# Patient Record
Sex: FEMALE | Race: WHITE | URBAN - METROPOLITAN AREA
[De-identification: names, ages, dates, MRNs, and addresses within clinical notes are randomized per-mention and may not be internally consistent; named-entity substitution may affect disease eponyms.]

---

## 2017-02-15 ENCOUNTER — OFFICE VISIT (OUTPATIENT)
Dept: FAMILY MEDICINE | Facility: CLINIC | Age: 27
End: 2017-02-15
Payer: COMMERCIAL

## 2017-02-15 VITALS
HEART RATE: 109 BPM | SYSTOLIC BLOOD PRESSURE: 126 MMHG | OXYGEN SATURATION: 98 % | TEMPERATURE: 97 F | BODY MASS INDEX: 27.49 KG/M2 | WEIGHT: 161 LBS | HEIGHT: 64 IN | DIASTOLIC BLOOD PRESSURE: 87 MMHG

## 2017-02-15 DIAGNOSIS — F41.9 ANXIETY: Primary | ICD-10-CM

## 2017-02-15 DIAGNOSIS — R87.810 ASCUS WITH POSITIVE HIGH RISK HPV CERVICAL: ICD-10-CM

## 2017-02-15 DIAGNOSIS — R87.610 ASCUS WITH POSITIVE HIGH RISK HPV CERVICAL: ICD-10-CM

## 2017-02-15 PROCEDURE — 99214 OFFICE O/P EST MOD 30 MIN: CPT | Performed by: PHYSICIAN ASSISTANT

## 2017-02-15 RX ORDER — ALPRAZOLAM 0.5 MG
0.5 TABLET ORAL 3 TIMES DAILY PRN
Qty: 30 TABLET | Refills: 0 | Status: SHIPPED | OUTPATIENT
Start: 2017-02-15 | End: 2017-12-08

## 2017-02-15 RX ORDER — MULTIPLE VITAMINS W/ MINERALS TAB 9MG-400MCG
1 TAB ORAL DAILY
Qty: 100 TABLET | Refills: 3 | COMMUNITY
Start: 2017-02-15

## 2017-02-15 RX ORDER — ESCITALOPRAM OXALATE 20 MG/1
TABLET ORAL
Qty: 30 TABLET | Refills: 1 | Status: SHIPPED | OUTPATIENT
Start: 2017-02-15 | End: 2017-04-24

## 2017-02-15 ASSESSMENT — ANXIETY QUESTIONNAIRES
7. FEELING AFRAID AS IF SOMETHING AWFUL MIGHT HAPPEN: MORE THAN HALF THE DAYS
6. BECOMING EASILY ANNOYED OR IRRITABLE: SEVERAL DAYS
5. BEING SO RESTLESS THAT IT IS HARD TO SIT STILL: NOT AT ALL
2. NOT BEING ABLE TO STOP OR CONTROL WORRYING: MORE THAN HALF THE DAYS
3. WORRYING TOO MUCH ABOUT DIFFERENT THINGS: MORE THAN HALF THE DAYS
GAD7 TOTAL SCORE: 11
1. FEELING NERVOUS, ANXIOUS, OR ON EDGE: NEARLY EVERY DAY
IF YOU CHECKED OFF ANY PROBLEMS ON THIS QUESTIONNAIRE, HOW DIFFICULT HAVE THESE PROBLEMS MADE IT FOR YOU TO DO YOUR WORK, TAKE CARE OF THINGS AT HOME, OR GET ALONG WITH OTHER PEOPLE: SOMEWHAT DIFFICULT

## 2017-02-15 ASSESSMENT — PATIENT HEALTH QUESTIONNAIRE - PHQ9: 5. POOR APPETITE OR OVEREATING: SEVERAL DAYS

## 2017-02-15 NOTE — NURSING NOTE
"Chief Complaint   Patient presents with     Recheck Medication       Initial /87 (BP Location: Left arm, Patient Position: Left side, Cuff Size: Adult Regular)  Pulse 109  Temp 97  F (36.1  C) (Oral)  Ht 5' 4.17\" (1.63 m)  Wt 161 lb (73 kg)  LMP 01/01/2017  SpO2 98%  BMI 27.49 kg/m2 Estimated body mass index is 27.49 kg/(m^2) as calculated from the following:    Height as of this encounter: 5' 4.17\" (1.63 m).    Weight as of this encounter: 161 lb (73 kg).  Medication Reconciliation: complete   Sara Rdz MA Student      "

## 2017-02-15 NOTE — PROGRESS NOTES
SUBJECTIVE:                                                    Bita Alaniz is a 26 year old female who presents to clinic today for the following health issues:      Medication Followup of Zolof    Taking Medication as prescribed: Stopped taking then restarted taking it again    Side Effects:  Gives her insomnia and joint pain until patient's body gets used to it    Medication Helping Symptoms:  Yes, if taken consistently.     Insurance won't cover zoloft  Anxiety is worse due to job loss.  Very anxious about job interviews.  Has been back on zoloft for one week.  Has helped anxiety too.    Tried paxil in past.    Has to take the zoloft in the am due it causing insomnia   Very anxious about colposcopy that she has to have soon.  She can't stop thinking that they will find cancer.  Doesn't want to go due to anxiety.          Problem list and histories reviewed & adjusted, as indicated.  Additional history: as documented    Patient Active Problem List   Diagnosis     PMS (premenstrual syndrome)     Low back pain     Menorrhagia     CARDIOVASCULAR SCREENING; LDL GOAL LESS THAN 160     Sweating     Anxiety     Contraception management     Genital warts     ASCUS with positive high risk HPV cervical     Past Surgical History   Procedure Laterality Date     C oral surgery procedure       wisdom teeth removed       Social History   Substance Use Topics     Smoking status: Current Every Day Smoker     Packs/day: 0.75     Types: Cigarettes     Smokeless tobacco: Never Used      Comment: started at age 15, not much second hand     Alcohol use Yes      Comment: occasionally     Family History   Problem Relation Age of Onset     Musculoskeletal Disorder Mother      DIABETES Mother      Psychotic Disorder Mother      anxiety, ocd     Musculoskeletal Disorder Maternal Grandmother      CANCER Maternal Grandmother      lung     CANCER Paternal Grandmother      Unknown/Adopted Father            ROS:  As  "above    OBJECTIVE:                                                    /87 (BP Location: Left arm, Patient Position: Left side, Cuff Size: Adult Regular)  Pulse 109  Temp 97  F (36.1  C) (Oral)  Ht 5' 4.17\" (1.63 m)  Wt 161 lb (73 kg)  LMP 01/01/2017  SpO2 98%  BMI 27.49 kg/m2  Body mass index is 27.49 kg/(m^2).  GENERAL: healthy, alert and no distress  PSYCH: mentation appears normal, tearful and anxious    Diagnostic Test Results:  none      ASSESSMENT/PLAN:                                                      1. Anxiety  Try lexapro.  Ok to use xanax as needed until lexapro starts to work.  Follow up in 3 weeks.  Sooner if needed  - escitalopram (LEXAPRO) 20 MG tablet; Take 1/2 tablet (10 mg) for 1 weeks, then increase to 1 tablet orally daily  Dispense: 30 tablet; Refill: 1  - ALPRAZolam (XANAX) 0.5 MG tablet; Take 1 tablet (0.5 mg) by mouth 3 times daily as needed for anxiety  Dispense: 30 tablet; Refill: 0  - multivitamin, therapeutic with minerals (MULTI-VITAMIN) TABS tablet; Take 1 tablet by mouth daily  Dispense: 100 tablet; Refill: 3    2. ASCUS with positive high risk HPV cervical  Spent most of the visit discussing the potential outcomes of the colposcopy and the likelyhood of it being cancer.  Strongly encouraged her to follow through.  25 minutes spent with patient, over 50% of that time spent in counseling and coordinating care        FUTURE APPOINTMENTS:       - Follow-up visit in 3 weeks    Olya Connors PA-C  [facility]    "

## 2017-02-15 NOTE — MR AVS SNAPSHOT
After Visit Summary   2/15/2017    Bita Alaniz    MRN: [id number]           Patient Information     Date Of Birth          1990        Visit Information        Provider Department      2/15/2017 10:40 AM Olya Connors PA-C [facility]        Today's Diagnoses     Anxiety    -  1    ASCUS with positive high risk HPV cervical          Care Instructions    Take the zoloft every other day then start the lexapro  Use xanax as needed-try to limit use          Follow-ups after your visit        Follow-up notes from your care team     Return in about 3 weeks (around 3/8/2017) for mood .      Your next 10 appointments already scheduled     Feb 24, 2017 11:15 AM CST   Office Visit with Ilia Lynn MD, [place] [facility]   [facility] ([address] [id number] [phone]           Bring a current list of meds and any records pertaining to this visit.  For Physicals, please bring immunization records and any forms needing to be filled out.  Please arrive 10 minutes early to complete paperwork.              Who to contact     If you have questions or need follow up information about today's clinic visit or your schedule please contact [facility] directly at [phone].  Normal or non-critical lab and imaging results will be communicated to you by MyChart, letter or phone within 4 business days after the clinic has received the results. If you do not hear from us within 7 days, please contact the clinic through MyChart or phone. If you have a critical or abnormal lab result, we will notify you by phone as soon as possible.  Submit refill requests through [employer] or call your pharmacy and they will forward the refill request to us. Please allow 3 business days for your refill to be completed.          Additional Information About Your Visit        [facility]  "Information     [employer] gives you secure access to your electronic health record. If you see a primary care provider, you can also send messages to your care team and make appointments. If you have questions, please call your primary care clinic.  If you do not have a primary care provider, please call [phone] and they will assist you.        Care EveryWhere ID     This is your Care EveryWhere ID. This could be used by other organizations to access your [facility] medical records  [id number]        Your Vitals Were     Pulse Temperature Height Last Period Pulse Oximetry BMI (Body Mass Index)    109 97  F (36.1  C) (Oral) 5' 4.17\" (1.63 m) 01/01/2017 98% 27.49 kg/m2       Blood Pressure from Last 3 Encounters:   02/15/17 126/87   06/21/16 112/80   01/27/16 122/73    Weight from Last 3 Encounters:   02/15/17 161 lb (73 kg)   06/21/16 150 lb (68 kg)   03/20/16 151 lb (68.5 kg)              Today, you had the following     No orders found for display         Today's Medication Changes          These changes are accurate as of: 2/15/17 11:36 AM.  If you have any questions, ask your nurse or doctor.               Start taking these medicines.        Dose/Directions    ALPRAZolam 0.5 MG tablet   Commonly known as:  XANAX   Used for:  Anxiety   Started by:  Olya Connors PA-C        Dose:  0.5 mg   Take 1 tablet (0.5 mg) by mouth 3 times daily as needed for anxiety   Quantity:  30 tablet   Refills:  0       escitalopram 20 MG tablet   Commonly known as:  LEXAPRO   Used for:  Anxiety   Started by:  Olya Connors PA-C        Take 1/2 tablet (10 mg) for 1 weeks, then increase to 1 tablet orally daily   Quantity:  30 tablet   Refills:  1         Stop taking these medicines if you haven't already. Please contact your care team if you have questions.     sertraline 50 MG tablet   Commonly known as:  ZOLOFT   Stopped by:  Olya Connors PA-C                Where to get your medicines      These " medications were sent to [facility]/pharmacy #[id number] - [place], [place] - [address]  [address] [facility] [place] [address]     Phone:  [phone]     escitalopram 20 MG tablet         Some of these will need a paper prescription and others can be bought over the counter.  Ask your nurse if you have questions.     Bring a paper prescription for each of these medications     ALPRAZolam 0.5 MG tablet                Primary Care Provider Office Phone # Fax #    Olya Connors PA-C [phone]       [facility] [address]  [facility] [address]        Thank you!     Thank you for choosing [facility]  for your care. Our goal is always to provide you with excellent care. Hearing back from our patients is one way we can continue to improve our services. Please take a few minutes to complete the written survey that you may receive in the mail after your visit with us. Thank you!             Your Updated Medication List - Protect others around you: Learn how to safely use, store and throw away your medicines at www.disposemymeds.org.          This list is accurate as of: 2/15/17 11:36 AM.  Always use your most recent med list.                   Brand Name Dispense Instructions for use    ALPRAZolam 0.5 MG tablet    XANAX    30 tablet    Take 1 tablet (0.5 mg) by mouth 3 times daily as needed for anxiety       aluminum chloride 20 % external solution    DRYSOL    60 mL    Apply  topically At Bedtime. To improve effect, cover area of application with plastic wrap,  hold in place with tight shirt, and wash area in morning. As sweating improves, decrease use to 1-2 times weekly.       escitalopram 20 MG tablet    LEXAPRO    30 tablet    Take 1/2 tablet (10 mg) for 1 weeks, then increase to 1 tablet orally daily       ibuprofen 200 MG tablet    ADVIL/MOTRIN     Take 200 mg by mouth as needed. 2-3 TABLETS AT ONE TIME       Multi-vitamin Tabs tablet     100  tablet    Take 1 tablet by mouth daily       norgestim-eth estrad triphasic 0.18/0.215/0.25 MG-35 MCG per tablet    TRINESSA (28)    90 tablet    Take 1 tablet by mouth daily May take continuously, having a period every 3 months.       podofilox 0.5 % external solution    CONDYLOX    60 mL    Apply topically 2 times daily For 3 days then stop for 4 days.  If needed repeat 4 times       TYLENOL 500 MG tablet   Generic drug:  acetaminophen      Take 1-2 tablets by mouth as needed. 1 TABLET AS NEEDED

## 2017-02-16 ASSESSMENT — ANXIETY QUESTIONNAIRES: GAD7 TOTAL SCORE: 11

## 2017-02-24 ENCOUNTER — OFFICE VISIT (OUTPATIENT)
Dept: OBGYN | Facility: CLINIC | Age: 27
End: 2017-02-24
Payer: COMMERCIAL

## 2017-02-24 VITALS
WEIGHT: 169 LBS | BODY MASS INDEX: 28.85 KG/M2 | OXYGEN SATURATION: 98 % | DIASTOLIC BLOOD PRESSURE: 86 MMHG | SYSTOLIC BLOOD PRESSURE: 128 MMHG | HEART RATE: 106 BPM

## 2017-02-24 DIAGNOSIS — N87.0 CIN I (CERVICAL INTRAEPITHELIAL NEOPLASIA I): ICD-10-CM

## 2017-02-24 DIAGNOSIS — R87.610 ASCUS WITH POSITIVE HIGH RISK HPV CERVICAL: Primary | ICD-10-CM

## 2017-02-24 DIAGNOSIS — R87.810 ASCUS WITH POSITIVE HIGH RISK HPV CERVICAL: Primary | ICD-10-CM

## 2017-02-24 PROCEDURE — 99213 OFFICE O/P EST LOW 20 MIN: CPT | Mod: 25 | Performed by: OBSTETRICS & GYNECOLOGY

## 2017-02-24 PROCEDURE — 88305 TISSUE EXAM BY PATHOLOGIST: CPT | Performed by: OBSTETRICS & GYNECOLOGY

## 2017-02-24 PROCEDURE — 88342 IMHCHEM/IMCYTCHM 1ST ANTB: CPT | Performed by: OBSTETRICS & GYNECOLOGY

## 2017-02-24 PROCEDURE — 57454 BX/CURETT OF CERVIX W/SCOPE: CPT | Performed by: OBSTETRICS & GYNECOLOGY

## 2017-02-24 PROCEDURE — 88341 IMHCHEM/IMCYTCHM EA ADD ANTB: CPT | Performed by: OBSTETRICS & GYNECOLOGY

## 2017-02-24 NOTE — LETTER
April 21, 2017      Bita Mata Corbin  [address] [place] [id number]    Dear MsJaninaEsperanza Alaniz,      At [facility], your health and wellness is our primary concern. That is why we are following up on recommendations from Dr. Lynn. Your recent colposcopy showed NATE 2-3 and CIS, which is clinically significant and needs follow up. Dr. Lynn recommended that you have a LEEP completed by 5/4/17. Our records do not show that this has been done.      It is important to complete the follow up that your provider has suggested for you to ensure that there are no worsening changes which may, over time, develop into cancer.      Please contact our office at  [phone] to schedule an appointment for a LEEP at your earliest convenience. If you have questions or concerns, please call the clinic and we will be happy to assist you.    If you have completed the tests outside of [facility], please have the results forwarded to our office. We will update the chart for your primary Physician to review before your next annual physical.     Thank you for choosing [facility]!    Sincerely,      Ilia Lynn MD/cmc

## 2017-02-24 NOTE — NURSING NOTE
"Chief Complaint   Patient presents with     Colposcopy     ASC-US HPV other+. Had colp 6/2015 also       Initial /86 (BP Location: Right arm, Patient Position: Chair, Cuff Size: Adult Regular)  Pulse 106  Wt 169 lb (76.7 kg)  LMP 01/01/2017  SpO2 98%  Breastfeeding? No  BMI 28.85 kg/m2 Estimated body mass index is 28.85 kg/(m^2) as calculated from the following:    Height as of 2/15/17: 5' 4.17\" (1.63 m).    Weight as of this encounter: 169 lb (76.7 kg).  Medication Reconciliation: complete   JOE Tovar 2/24/2017         "

## 2017-02-24 NOTE — PROGRESS NOTES
Patient Name: Bita Alaniz              Date: 2/24/2017   YOB: 1990                         Age: 26 year old   Phone: [phone] (home)   ________________________________________________________________________  Bita is here today to discuss the pap smear, findings and possible further evaluation.  The patient's pap smear history is as noted:  1/28/15 ASCUS + HR HPV pap.   6/24/15 colposcopy. NATE 1. Plan: repeat pap and HPV test in 1 year. (due 6/24/16).   6/21/16 ASCUS/+ HR HPV  I attempted to ensure that the patient was educated regarding the nature of her findings and implications to date.  We reviewed the role of HPV, incidence in the population and the natural history of the infection, and its transmission.  We also reviewed ways to minimize her future risk, the effect of HPV on the cervix and treatment options available, should they be indicated.    The pathophysiology of the cervix, including a discussion of the squamous and columnar cells, metaplasia and dysplasia have been reviewed, drawings, sketches and the pamphlets were reviewed with her.      Patient's last menstrual period was 01/01/2017.  Current Birth Control Method: OCPs  Age at first sexual intercourse: 13 years old  Number of sexual partners (lifetime): 10  No new sexual partners in past 6 years.   History of veneral diseases: : No  History of genital warts: No  Visible warts now?: No  Family History of Cervical, Uterine or Vaginal Cancer?: No    Past Medical History   Diagnosis Date     ASCUS with positive high risk HPV 1/28/15, 6/20/16     colposcopy 6/2015     Depression      resolved     JRA (juvenile rheumatoid arthritis) (H) 1993     knees,feet      LBP (low back pain)      Menorrhagia 3/11/2010     Ovarian cyst      PMS (premenstrual syndrome)        Past Surgical History   Procedure Laterality Date     C oral surgery procedure       wisdom teeth removed        Outpatient Encounter Prescriptions as of 2/24/2017    Medication Sig Dispense Refill     escitalopram (LEXAPRO) 20 MG tablet Take 1/2 tablet (10 mg) for 1 weeks, then increase to 1 tablet orally daily 30 tablet 1     ALPRAZolam (XANAX) 0.5 MG tablet Take 1 tablet (0.5 mg) by mouth 3 times daily as needed for anxiety 30 tablet 0     multivitamin, therapeutic with minerals (MULTI-VITAMIN) TABS tablet Take 1 tablet by mouth daily 100 tablet 3     norgestim-eth estrad triphasic (TRINESSA, 28,) 0.18/0.215/0.25 MG-35 MCG per tablet Take 1 tablet by mouth daily May take continuously, having a period every 3 months. 90 tablet 4     podofilox (CONDYLOX) 0.5 % external solution Apply topically 2 times daily For 3 days then stop for 4 days.  If needed repeat 4 times 60 mL 5     aluminum chloride (DRYSOL) 20 % external solution Apply  topically At Bedtime. To improve effect, cover area of application with plastic wrap,  hold in place with tight shirt, and wash area in morning. As sweating improves, decrease use to 1-2 times weekly. (Patient not taking: Reported on 2/15/2017) 60 mL 3     ibuprofen (ADVIL,MOTRIN) 200 MG tablet Take 200 mg by mouth as needed. 2-3 TABLETS AT ONE TIME       acetaminophen (TYLENOL) 500 MG tablet Take 1-2 tablets by mouth as needed. 1 TABLET AS NEEDED       No facility-administered encounter medications on file as of 2/24/2017.         Allergies as of 02/24/2017 - Tam as Reviewed 02/24/2017   Allergen Reaction Noted     Penicillins Hives 11/18/2009     Tramadol Nausea 08/10/2015     Vicodin [hydrocodone-acetaminophen] Nausea 08/10/2015       Social History     Social History     Marital status: Single     Spouse name: N/A     Number of children: N/A     Years of education: N/A     Social History Main Topics     Smoking status: Current Every Day Smoker     Packs/day: 0.75     Types: Cigarettes     Smokeless tobacco: Never Used      Comment: started at age 15, not much second hand     Alcohol use Yes      Comment: occasionally     Drug use: No      Sexual activity: Yes     Partners: Male      Comment: 1 partner     Other Topics Concern     Parent/Sibling W/ Cabg, Mi Or Angioplasty Before 65f 55m? No      Service No     Blood Transfusions No     Caffeine Concern No     1 cup coffee daily and a can pop.     Occupational Exposure No     Hobby Hazards No     Sleep Concern No     Stress Concern No     Weight Concern No     Special Diet No     Exercise Yes     walking 2 times a week and walk a lot at work.     Seat Belt Yes     Social History Narrative    Lives with her boyfriend at home.        Family History   Problem Relation Age of Onset     Musculoskeletal Disorder Mother      DIABETES Mother      Psychotic Disorder Mother      anxiety, ocd     Musculoskeletal Disorder Maternal Grandmother      CANCER Maternal Grandmother      lung     CANCER Paternal Grandmother      Unknown/Adopted Father          Review Of Systems  Review of Systems:  10 point ROS of systems including Constitutional, Eyes, Respiratory, Cardiovascular, Gastroenterology, Genitourinary, Integumentary, Muscularskeletal, Psychiatric were all negative except for pertinent positives noted in my HPI and in the PMH.      Exam:   /86 (BP Location: Right arm, Patient Position: Chair, Cuff Size: Adult Regular)  Pulse 106  Wt 169 lb (76.7 kg)  LMP 01/01/2017  SpO2 98%  Breastfeeding? No  BMI 28.85 kg/m2  GENERAL:  WNWD female NAD  HEENT: NC/AT, EOMI  SKIN: normal skin turgor  GAIT: Normal  NECK: Symmetrical, no masses noted   VULVA: Normal Genitalia  BUS: Normal  URETHRA:  No hypermobility noted  URETHRAL MEATUS:  No masses noted  VAGINA: Normal mucosa, no discharge  CERVIX: Closed, mobile, no discharge  PERIANAL:  No masses or lesions seen  EXTREMITIES: no clubbing, cyanosis, or edema    Assessment:  ASCUS pap smear with high risk HPV  NATE I    Plan:  Recommend to Proceed with Colpo  The details of the colposcopic procedure were reviewed, the risks of missed diagnoses, pain, infection, and  bleeding.    TT 20 min, in addition to the time for the procedure  CT greater than 50%, as noted above in the HPI and in the Plan.     Ilia Lynn MD        Procedure:  Procedure for colposcopy and biopsy has been explained to the patient and consent obtained.    Before the procedure, it was ensured that the patient was educated regarding the nature of her findings and implications to date.  We reviewed the role of HPV and the natural history of the infection.  We also reviewed ways to minimize her future risk, the effect of HPV on the cervix and treatment options available, should they be indicated.    The pathophysiology of the cervix, including a discussion of the squamous and columnar cells, metaplasia and dysplasia have been reviewed, drawings, sketches and the pamphlets were reviewed with her.  The details of the colposcopic procedure were reviewed, the risks of missed diagnoses, pain, infection, and bleeding.  Questions seemed to be answered before proceeding and the patient then consented to the procedure.     Speculum placed in vagina and excellent visualization of cervix achieved, cervix swabbed  with acetic acid solution.    biopsies taken (including ECC): 3  Hemostasis effected with Monsel's solution.    Findings:    No images are attached to the encounter.     Cervix: acetowhitening noted around the 5 and 7 o'clock areas of the cervix and no concerning findings  Vaginal inspection: normal without visible lesions.  Procedure Summary: Patient tolerated procedure well and colposcopy adequate.      Assessment:   ASCUS pap smear and high risk HPV  NATE I    Plan:  Specimens labelled and sent to pathology.  Will base further treatment on pathology findings.  Post biopsy instructions given to patient and call to discuss Pathology results.    Ilia Lynn MD

## 2017-02-24 NOTE — MR AVS SNAPSHOT
After Visit Summary   2/24/2017    Bita Alaniz    MRN: [id number]           Patient Information     Date Of Birth          1990        Visit Information        Provider Department      2/24/2017 11:15 AM Ilia Lynn MD; [facility]        Today's Diagnoses     ASCUS with positive high risk HPV cervical    -  1    NATE I (cervical intraepithelial neoplasia I)           Follow-ups after your visit        Who to contact     If you have questions or need follow up information about today's clinic visit or your schedule please contact [facility] directly at [phone].  Normal or non-critical lab and imaging results will be communicated to you by MyChart, letter or phone within 4 business days after the clinic has received the results. If you do not hear from us within 7 days, please contact the clinic through [email or web address] or phone. If you have a critical or abnormal lab result, we will notify you by phone as soon as possible.  Submit refill requests through [employer] or call your pharmacy and they will forward the refill request to us. Please allow 3 business days for your refill to be completed.          Additional Information About Your Visit        MyChart Information     [employer] gives you secure access to your electronic health record. If you see a primary care provider, you can also send messages to your care team and make appointments. If you have questions, please call your primary care clinic.  If you do not have a primary care provider, please call [phone] and they will assist you.        Care EveryWhere ID     This is your Care EveryWhere ID. This could be used by other organizations to access your [place] medical records  [id number]        Your Vitals Were     Pulse Last Period Pulse Oximetry Breastfeeding? BMI (Body Mass Index)       106 01/01/2017 98% No 28.85 kg/m2        Blood Pressure from Last 3 Encounters:    02/24/17 128/86   02/15/17 126/87   06/21/16 112/80    Weight from Last 3 Encounters:   02/24/17 169 lb (76.7 kg)   02/15/17 161 lb (73 kg)   06/21/16 150 lb (68 kg)              We Performed the Following     COLP CERVIX/UPPER VAGINA     Surgical pathology exam        Primary Care Provider Office Phone # Fax #    Olya Connors PA-C [phone]       [facility] [address]  [facility] [place] [address]        Thank you!     Thank you for choosing [facility]  for your care. Our goal is always to provide you with excellent care. Hearing back from our patients is one way we can continue to improve our services. Please take a few minutes to complete the written survey that you may receive in the mail after your visit with us. Thank you!             Your Updated Medication List - Protect others around you: Learn how to safely use, store and throw away your medicines at www.disposemymeds.org.          This list is accurate as of: 2/24/17 12:06 PM.  Always use your most recent med list.                   Brand Name Dispense Instructions for use    ALPRAZolam 0.5 MG tablet    XANAX    30 tablet    Take 1 tablet (0.5 mg) by mouth 3 times daily as needed for anxiety       aluminum chloride 20 % external solution    DRYSOL    60 mL    Apply  topically At Bedtime. To improve effect, cover area of application with plastic wrap,  hold in place with tight shirt, and wash area in morning. As sweating improves, decrease use to 1-2 times weekly.       escitalopram 20 MG tablet    LEXAPRO    30 tablet    Take 1/2 tablet (10 mg) for 1 weeks, then increase to 1 tablet orally daily       ibuprofen 200 MG tablet    ADVIL/MOTRIN     Take 200 mg by mouth as needed. 2-3 TABLETS AT ONE TIME       Multi-vitamin Tabs tablet     100 tablet    Take 1 tablet by mouth daily       norgestim-eth estrad triphasic 0.18/0.215/0.25 MG-35 MCG per tablet    TRINESSA (28)    90 tablet    Take 1  tablet by mouth daily May take continuously, having a period every 3 months.       podofilox 0.5 % external solution    CONDYLOX    60 mL    Apply topically 2 times daily For 3 days then stop for 4 days.  If needed repeat 4 times       TYLENOL 500 MG tablet   Generic drug:  acetaminophen      Take 1-2 tablets by mouth as needed. 1 TABLET AS NEEDED

## 2017-03-01 LAB — COPATH REPORT: NORMAL

## 2017-04-21 PROBLEM — D06.9 CIN III (CERVICAL INTRAEPITHELIAL NEOPLASIA III): Status: ACTIVE | Noted: 2017-04-07

## 2017-04-25 ENCOUNTER — TELEPHONE (OUTPATIENT)
Dept: OBGYN | Facility: CLINIC | Age: 27
End: 2017-04-25

## 2017-04-25 NOTE — TELEPHONE ENCOUNTER
Myra Garrido contacted Bita on 04/25/17 and left a message. If patient calls back please contact care team.

## 2017-05-01 ENCOUNTER — TELEPHONE (OUTPATIENT)
Dept: OBGYN | Facility: CLINIC | Age: 27
End: 2017-05-01

## 2017-05-01 NOTE — TELEPHONE ENCOUNTER
Reason for Call:  Other appointment    Detailed comments: Patient would like a call back for schedule for a Leep procedure    Phone Number Patient can be reached at: Home number on file [phone] (home) or Cell number on file:    Telephone Information:   Mobile [phone]       Best Time: anytime    Can we leave a detailed message on this number? YES    Call taken on 5/1/2017 at 1:30 PM by Shanta Obrien

## 2017-05-02 NOTE — TELEPHONE ENCOUNTER
I called patient and scheduled her for LEEP procedure at [place] on 5/26/17 12:15.  She No showed previous. Told to be sure and cancel ahead of time if needs too.  She will take Ibuprofen 1 hour ahead.  JOE Tovar 5/2/2017

## 2017-05-26 ENCOUNTER — OFFICE VISIT (OUTPATIENT)
Dept: OBGYN | Facility: CLINIC | Age: 27
End: 2017-05-26
Payer: COMMERCIAL

## 2017-05-26 VITALS
DIASTOLIC BLOOD PRESSURE: 88 MMHG | OXYGEN SATURATION: 100 % | WEIGHT: 177.4 LBS | HEART RATE: 115 BPM | SYSTOLIC BLOOD PRESSURE: 128 MMHG | BODY MASS INDEX: 30.29 KG/M2

## 2017-05-26 DIAGNOSIS — D06.9 CIS (CARCINOMA IN SITU OF CERVIX): Primary | ICD-10-CM

## 2017-05-26 LAB — BETA HCG QUAL IFA URINE: NEGATIVE

## 2017-05-26 PROCEDURE — 84703 CHORIONIC GONADOTROPIN ASSAY: CPT | Performed by: OBSTETRICS & GYNECOLOGY

## 2017-05-26 PROCEDURE — 57461 CONZ OF CERVIX W/SCOPE LEEP: CPT | Performed by: OBSTETRICS & GYNECOLOGY

## 2017-05-26 PROCEDURE — 88305 TISSUE EXAM BY PATHOLOGIST: CPT | Performed by: OBSTETRICS & GYNECOLOGY

## 2017-05-26 NOTE — MR AVS SNAPSHOT
After Visit Summary   5/26/2017    Bita Alaniz    MRN: [id number]           Patient Information     Date Of Birth          1990        Visit Information        Provider Department      5/26/2017 12:15 PM Ilia Lynn MD; [facility] PROC ROOM [place] [facility]        Today's Diagnoses     CIS (carcinoma in situ of cervix)    -  1       Follow-ups after your visit        Who to contact     If you have questions or need follow up information about today's clinic visit or your schedule please contact [facility] directly at [phone].  Normal or non-critical lab and imaging results will be communicated to you by [employer], letter or phone within 4 business days after the clinic has received the results. If you do not hear from us within 7 days, please contact the clinic through [employer] or phone. If you have a critical or abnormal lab result, we will notify you by phone as soon as possible.  Submit refill requests through [employer] or call your pharmacy and they will forward the refill request to us. Please allow 3 business days for your refill to be completed.          Additional Information About Your Visit        MyChart Information     [employer] gives you secure access to your electronic health record. If you see a primary care provider, you can also send messages to your care team and make appointments. If you have questions, please call your primary care clinic.  If you do not have a primary care provider, please call [phone] and they will assist you.        Care EveryWhere ID     This is your Care EveryWhere ID. This could be used by other organizations to access your [place] medical records  [id number]        Your Vitals Were     Pulse Last Period Pulse Oximetry BMI (Body Mass Index)          115 03/26/2017 100% 30.29 kg/m2         Blood Pressure from Last 3 Encounters:   05/26/17 128/88   02/24/17 128/86   02/15/17 126/87    Weight from Last 3  Encounters:   05/26/17 177 lb 6.4 oz (80.5 kg)   02/24/17 169 lb (76.7 kg)   02/15/17 161 lb (73 kg)              We Performed the Following     Beta HCG qual IFA urine     COLP CERVIX/UPPER VAGINA W LOOP ELEC CONIZATION CERVIX     Surgical pathology exam        Primary Care Provider Office Phone # Fax #    Olya Connors PA-C [phone]       [facility] [address]  [facility] [address]        Thank you!     Thank you for choosing [facility]  for your care. Our goal is always to provide you with excellent care. Hearing back from our patients is one way we can continue to improve our services. Please take a few minutes to complete the written survey that you may receive in the mail after your visit with us. Thank you!             Your Updated Medication List - Protect others around you: Learn how to safely use, store and throw away your medicines at www.disposemymeds.org.          This list is accurate as of: 5/26/17  1:59 PM.  Always use your most recent med list.                   Brand Name Dispense Instructions for use    ALPRAZolam 0.5 MG tablet    XANAX    30 tablet    Take 1 tablet (0.5 mg) by mouth 3 times daily as needed for anxiety       aluminum chloride 20 % external solution    DRYSOL    60 mL    Apply  topically At Bedtime. To improve effect, cover area of application with plastic wrap,  hold in place with tight shirt, and wash area in morning. As sweating improves, decrease use to 1-2 times weekly.       escitalopram 20 MG tablet    LEXAPRO    30 tablet    Take 1 tablet (20 mg) by mouth daily Due for office visit       ibuprofen 200 MG tablet    ADVIL/MOTRIN     Take 200 mg by mouth as needed. 2-3 TABLETS AT ONE TIME       Multi-vitamin Tabs tablet     100 tablet    Take 1 tablet by mouth daily       norgestim-eth estrad triphasic 0.18/0.215/0.25 MG-35 MCG per tablet    TRINESSA (28)    90 tablet    Take 1 tablet by mouth daily May take  continuously, having a period every 3 months.       podofilox 0.5 % external solution    CONDYLOX    60 mL    Apply topically 2 times daily For 3 days then stop for 4 days.  If needed repeat 4 times       TYLENOL 500 MG tablet   Generic drug:  acetaminophen      Take 1-2 tablets by mouth as needed. 1 TABLET AS NEEDED

## 2017-05-26 NOTE — PROGRESS NOTES
Patient Name: Bita Alaniz              Date: 5/26/2017   YOB: 1990                         Age: 26 year old   Phone: [phone] (home)   ________________________________________________________________________  Patient is here today for the LEEP procedure.   The pap smear history and colpo history discussed.   We also discussed the tobacco use which has an effect on the cervical tissue also  Tobacco cessation discussed.  She will see her primary for additional assistance.      1/28/15 ASCUS + HR HPV pap. Plan: colposcopy  6/24/15 colposcopy. NATE 1. Plan: repeat pap and HPV test in 1 year. (due 6/24/16).   6/21/16 ASCUS/+ HR HPV. Plan: colposcopy bef 9/21/16. Patient lost her medical insurance. RN will send her resources for financial assistance.   12/8/16  [place] not done. Tracking updated for 6 mo pap.   2/24/17 [place] bx: NATE 2-3, CIS.      Past Medical History:   Diagnosis Date     ASCUS with positive high risk HPV 1/28/15, 6/20/16    colposcopy 6/2015     Depression     resolved     H/O colposcopy with cervical biopsy 02/24/2017    NATE 2/3, CIS     JRA (juvenile rheumatoid arthritis) (H) 1993    knees,feet      LBP (low back pain)      Menorrhagia 3/11/2010     Ovarian cyst      PMS (premenstrual syndrome)        Past Surgical History:   Procedure Laterality Date     C ORAL SURGERY PROCEDURE      wisdom teeth removed        Outpatient Encounter Prescriptions as of 5/26/2017   Medication Sig Dispense Refill     escitalopram (LEXAPRO) 20 MG tablet Take 1 tablet (20 mg) by mouth daily Due for office visit 30 tablet 0     ALPRAZolam (XANAX) 0.5 MG tablet Take 1 tablet (0.5 mg) by mouth 3 times daily as needed for anxiety 30 tablet 0     multivitamin, therapeutic with minerals (MULTI-VITAMIN) TABS tablet Take 1 tablet by mouth daily 100 tablet 3     norgestim-eth estrad triphasic (TRINESSA, 28,) 0.18/0.215/0.25 MG-35 MCG per tablet Take 1 tablet by mouth daily May take continuously, having a period  every 3 months. 90 tablet 4     podofilox (CONDYLOX) 0.5 % external solution Apply topically 2 times daily For 3 days then stop for 4 days.  If needed repeat 4 times 60 mL 5     aluminum chloride (DRYSOL) 20 % external solution Apply  topically At Bedtime. To improve effect, cover area of application with plastic wrap,  hold in place with tight shirt, and wash area in morning. As sweating improves, decrease use to 1-2 times weekly. 60 mL 3     ibuprofen (ADVIL,MOTRIN) 200 MG tablet Take 200 mg by mouth as needed. 2-3 TABLETS AT ONE TIME       acetaminophen (TYLENOL) 500 MG tablet Take 1-2 tablets by mouth as needed. 1 TABLET AS NEEDED       No facility-administered encounter medications on file as of 5/26/2017.         Allergies as of 05/26/2017 - Tam as Reviewed 02/24/2017   Allergen Reaction Noted     Penicillins Hives 11/18/2009     Tramadol Nausea 08/10/2015     Vicodin [hydrocodone-acetaminophen] Nausea 08/10/2015       Social History     Social History     Marital status: Single     Spouse name: N/A     Number of children: N/A     Years of education: N/A     Social History Main Topics     Smoking status: Current Every Day Smoker     Packs/day: 0.75     Types: Cigarettes     Smokeless tobacco: Never Used      Comment: started at age 15, not much second hand     Alcohol use Yes      Comment: occasionally     Drug use: No     Sexual activity: Yes     Partners: Male      Comment: 1 partner     Other Topics Concern     Parent/Sibling W/ Cabg, Mi Or Angioplasty Before 65f 55m? No      Service No     Blood Transfusions No     Caffeine Concern No     1 cup coffee daily and a can pop.     Occupational Exposure No     Hobby Hazards No     Sleep Concern No     Stress Concern No     Weight Concern No     Special Diet No     Exercise Yes     walking 2 times a week and walk a lot at work.     Seat Belt Yes     Social History Narrative    Lives with her boyfriend at home.        Family History   Problem Relation Age  of Onset     Musculoskeletal Disorder Mother      DIABETES Mother      Psychotic Disorder Mother      anxiety, ocd     Musculoskeletal Disorder Maternal Grandmother      CANCER Maternal Grandmother      lung     CANCER Paternal Grandmother      Unknown/Adopted Father          Review of Systems:  10 point ROS of systems including Constitutional, Eyes, Respiratory, Cardiovascular, Gastroenterology, Genitourinary, Integumentary, Muscularskeletal, Psychiatric were all negative except for pertinent positives noted in my HPI and in the PMH.      Exam:   /88 (BP Location: Right arm, Cuff Size: Adult Regular)  Pulse 115  Wt 177 lb 6.4 oz (80.5 kg)  LMP 03/26/2017  SpO2 100%  BMI 30.29 kg/m2  GENERAL:  WNWD female NAD  HEENT: NC/AT, EOMI  SKIN: normal skin turgor  GAIT: Normal  NECK: Symmetrical, no masses noted   VULVA: Normal Genitalia  BUS: Normal  URETHRA:  No hypermobility noted  URETHRAL MEATUS:  No masses noted  VAGINA: Normal mucosa, no discharge  CERVIX: Closed, mobile, no discharge  PERIANAL:  No masses or lesions seen  EXTREMITIES: no clubbing, cyanosis, or edema    Assessment:  CIS  Tobacco use     Plan:  Recommend to Proceed with the LEEP  The details of the colposcopic procedure were reviewed, the risks of missed diagnoses, pain, infection, and bleeding.  Recommend to discontinue the tobacco.  Her partner does not smoke but her parents do.  See Olya Connors for additional assistance regarding the tobacco use.      Ilia Lynn MD        Procedure:  The patient and I discussed the procedure and the indications.   We reviewed the risks, benefits, goals, and limitations, as well as the potential complications.  Alternatives were also reviewed.  The anticipated post-op course, including the success and failure rates, limitations and consequences, was reviewed.  The potential for fertility issues, 1/5 risk, was also reviewed.  She voiced her understanding and she desired to proceed with the  procedure.   After appropriate preparation, the colposcopic findings were noted.  Ten cc of 1% lidocaine with epinephrine, was injected into the cervix.  Using the wire loop and a blended current, the excision was performed with the specimen as labeled removing the transformation zone and the anticipated lesion with satisfactory margins and depth.  The inner cone was performed with the smaller loop to obtain additional endocervical tissue.   Endocervical curettings were obtained.  Light fulguration was performed and Monsel's solution/paste was applied.  No apparent complications.  Blood loose was minimal.  Patient tolerated the procedure well.    Patient was stable at discharge.  Await results.  Results will be called to patient or sent to her.  Instructions and information were given to the patient.    Follow up and repeat pap smears were discussed.

## 2017-05-26 NOTE — NURSING NOTE
"Chief Complaint   Patient presents with     Leep       Initial /88 (BP Location: Right arm, Cuff Size: Adult Regular)  Pulse 115  Wt 177 lb 6.4 oz (80.5 kg)  LMP 03/26/2017  SpO2 100%  BMI 30.29 kg/m2 Estimated body mass index is 30.29 kg/(m^2) as calculated from the following:    Height as of 2/15/17: 5' 4.17\" (1.63 m).    Weight as of this encounter: 177 lb 6.4 oz (80.5 kg).  Medication Reconciliation: complete   JOE Tovar 5/26/2017         "

## 2017-05-31 LAB — COPATH REPORT: NORMAL

## 2017-06-29 ENCOUNTER — MYC MEDICAL ADVICE (OUTPATIENT)
Dept: FAMILY MEDICINE | Facility: CLINIC | Age: 27
End: 2017-06-29

## 2017-06-29 DIAGNOSIS — Z30.41 ENCOUNTER FOR SURVEILLANCE OF CONTRACEPTIVE PILLS: ICD-10-CM

## 2017-06-29 NOTE — TELEPHONE ENCOUNTER
norgestim-eth estrad triphasic (TRINESSA, 28,) 0.18/0.215/0.25 MG-35 MCG per tablet      Last Written Prescription Date: 6-21-16  Last Fill Quantity: 90, # refills: 4  Last Office Visit with [facility], [facility] or [facility] prescribing provider: 2-15-17       BP Readings from Last 3 Encounters:   05/26/17 128/88   02/24/17 128/86   02/15/17 126/87     Date of last Breast Exam:

## 2017-06-30 DIAGNOSIS — A63.0 GENITAL WARTS: ICD-10-CM

## 2017-06-30 RX ORDER — NORGESTIMATE AND ETHINYL ESTRADIOL 7DAYSX3 28
1 KIT ORAL DAILY
Qty: 90 TABLET | Refills: 1 | Status: SHIPPED | OUTPATIENT
Start: 2017-06-30 | End: 2017-12-08

## 2017-06-30 NOTE — TELEPHONE ENCOUNTER
See note from last visit with Olya Connors PA-C:    Instructions        Return in about 3 weeks (around 3/8/2017) for mood .   Take the zoloft every other day then start the lexapro  Use xanax as needed-try to limit use           Prescription approved per [facility] Refill Protocol.    MyChart message sent to patient advising she still needs to be seen for mood issues.  Jennie Main RN  [facility]

## 2017-06-30 NOTE — TELEPHONE ENCOUNTER
Routing refill request to provider for review/approval because:  Drug not on the [facility] refill protocol     Jennie Main RN  [facility]

## 2017-06-30 NOTE — TELEPHONE ENCOUNTER
podofilox (CONDYLOX) 0.5 % external solution      Last Written Prescription Date:  6/21/16  Last Fill Quantity: 60,   # refills: 5  [facility] prescribing provider: 2/15/17  Future Office visit:       Routing refill request to provider for review/approval because:  Drug not on the [facility] refill protocol or controlled substance

## 2017-07-03 RX ORDER — PODOFILOX 5 MG/ML
SOLUTION TOPICAL
Qty: 59.5 ML | Refills: 3 | Status: SHIPPED | OUTPATIENT
Start: 2017-07-03 | End: 2018-03-21

## 2017-12-08 ENCOUNTER — RADIANT APPOINTMENT (OUTPATIENT)
Dept: GENERAL RADIOLOGY | Facility: CLINIC | Age: 27
End: 2017-12-08
Attending: PHYSICIAN ASSISTANT
Payer: COMMERCIAL

## 2017-12-08 ENCOUNTER — OFFICE VISIT (OUTPATIENT)
Dept: FAMILY MEDICINE | Facility: CLINIC | Age: 27
End: 2017-12-08
Payer: COMMERCIAL

## 2017-12-08 VITALS
HEART RATE: 111 BPM | WEIGHT: 187.8 LBS | BODY MASS INDEX: 32.06 KG/M2 | DIASTOLIC BLOOD PRESSURE: 83 MMHG | TEMPERATURE: 97.9 F | SYSTOLIC BLOOD PRESSURE: 123 MMHG

## 2017-12-08 DIAGNOSIS — M79.671 RIGHT FOOT PAIN: ICD-10-CM

## 2017-12-08 DIAGNOSIS — F41.9 ANXIETY: ICD-10-CM

## 2017-12-08 DIAGNOSIS — M79.671 RIGHT FOOT PAIN: Primary | ICD-10-CM

## 2017-12-08 DIAGNOSIS — Z30.41 ENCOUNTER FOR SURVEILLANCE OF CONTRACEPTIVE PILLS: ICD-10-CM

## 2017-12-08 DIAGNOSIS — Z72.0 TOBACCO ABUSE DISORDER: ICD-10-CM

## 2017-12-08 PROCEDURE — 73630 X-RAY EXAM OF FOOT: CPT | Mod: RT

## 2017-12-08 PROCEDURE — 99214 OFFICE O/P EST MOD 30 MIN: CPT | Performed by: PHYSICIAN ASSISTANT

## 2017-12-08 RX ORDER — NORGESTIMATE AND ETHINYL ESTRADIOL 7DAYSX3 28
1 KIT ORAL DAILY
Qty: 90 TABLET | Refills: 1 | Status: SHIPPED | OUTPATIENT
Start: 2017-12-08 | End: 2018-05-21

## 2017-12-08 RX ORDER — OXYCODONE AND ACETAMINOPHEN 5; 325 MG/1; MG/1
1 TABLET ORAL EVERY 8 HOURS PRN
Qty: 21 TABLET | Refills: 0 | Status: SHIPPED | OUTPATIENT
Start: 2017-12-08 | End: 2018-01-05

## 2017-12-08 RX ORDER — NICOTINE 21 MG/24HR
1 PATCH, TRANSDERMAL 24 HOURS TRANSDERMAL EVERY 24 HOURS
Qty: 30 PATCH | Refills: 1 | Status: SHIPPED | OUTPATIENT
Start: 2017-12-08 | End: 2018-03-21

## 2017-12-08 RX ORDER — ALPRAZOLAM 0.5 MG
0.5 TABLET ORAL 3 TIMES DAILY PRN
Qty: 30 TABLET | Refills: 0 | Status: SHIPPED | OUTPATIENT
Start: 2017-12-08 | End: 2018-05-21

## 2017-12-08 ASSESSMENT — ANXIETY QUESTIONNAIRES
GAD7 TOTAL SCORE: 5
6. BECOMING EASILY ANNOYED OR IRRITABLE: NOT AT ALL
IF YOU CHECKED OFF ANY PROBLEMS ON THIS QUESTIONNAIRE, HOW DIFFICULT HAVE THESE PROBLEMS MADE IT FOR YOU TO DO YOUR WORK, TAKE CARE OF THINGS AT HOME, OR GET ALONG WITH OTHER PEOPLE: SOMEWHAT DIFFICULT
1. FEELING NERVOUS, ANXIOUS, OR ON EDGE: SEVERAL DAYS
2. NOT BEING ABLE TO STOP OR CONTROL WORRYING: SEVERAL DAYS
7. FEELING AFRAID AS IF SOMETHING AWFUL MIGHT HAPPEN: SEVERAL DAYS
3. WORRYING TOO MUCH ABOUT DIFFERENT THINGS: SEVERAL DAYS
5. BEING SO RESTLESS THAT IT IS HARD TO SIT STILL: NOT AT ALL

## 2017-12-08 ASSESSMENT — PATIENT HEALTH QUESTIONNAIRE - PHQ9
SUM OF ALL RESPONSES TO PHQ QUESTIONS 1-9: 0
5. POOR APPETITE OR OVEREATING: SEVERAL DAYS

## 2017-12-08 NOTE — PROGRESS NOTES
"  SUBJECTIVE:   Bita Alaniz is a 27 year old female who presents to clinic today for the following health issues:      Joint Pain    Onset: Couple weeks of intense pain    Description:   Location: right ankle and and top of right foot  Character: Sharp    Intensity: 8/10- when she is at work and few hours afterwards.     Progression of Symptoms: worse    Accompanying Signs & Symptoms:  Other symptoms: radiation of pain to back of right leg calf area., numbness and swelling    History:   Previous similar pain: YES- has always had a pain due to flat feet and bones that are formed differently, but she stated the intense pain is something completely new.      Precipitating factors:   Trauma or overuse: no     Alleviating factors:  Improved by: rest/inactivity, acetaminophen and Ibuprofen-not helping     Therapies Tried and outcome: She said rest helps a little and taking pain relievers all the time. She tries to ice but hasn't had much relief.       Swelling is new and pain over outside of right foot is new and worse.  Pain starts after an hour of on her feet.  Hurts for about 3 hours after getting home.  Pain below malleolus and in ankle.      Has had some numbness in last 2 toes but that is not new with this new pain.      Right foot has always been her \"problem foot\"    Stopped lexapro due to weight gain several months ago.  Has used xanax occasionally, mainly at work.  Overall her anxiety feels better but still gets panic attacks in certain situations.  30 xanax lasted 6 months.  lexapro was helping.        Cut back on smoking.        Problem list and histories reviewed & adjusted, as indicated.  Additional history: as documented    Patient Active Problem List   Diagnosis     PMS (premenstrual syndrome)     Low back pain     Menorrhagia     CARDIOVASCULAR SCREENING; LDL GOAL LESS THAN 160     Sweating     Anxiety     Contraception management     Genital warts     NATE III (cervical intraepithelial neoplasia " III)     Tobacco abuse disorder     Past Surgical History:   Procedure Laterality Date     C ORAL SURGERY PROCEDURE      wisdom teeth removed     LEEP TX, CERVICAL  05/26/2017 5/26/17 LEEP: NATE 3, neg margins. ECC: neg       Social History   Substance Use Topics     Smoking status: Current Every Day Smoker     Packs/day: 0.75     Types: Cigarettes     Smokeless tobacco: Never Used      Comment: started at age 15, not much second hand     Alcohol use Yes      Comment: occasionally     Family History   Problem Relation Age of Onset     Musculoskeletal Disorder Mother      DIABETES Mother      Psychotic Disorder Mother      anxiety, ocd     Musculoskeletal Disorder Maternal Grandmother      CANCER Maternal Grandmother      lung     CANCER Paternal Grandmother      Unknown/Adopted Father              Reviewed and updated as needed this visit by clinical staffTobacco  Allergies  Meds  Med Hx  Surg Hx  Fam Hx  Soc Hx      Reviewed and updated as needed this visit by Provider         ROS:  As above    OBJECTIVE:     /83 (BP Location: Right arm, Patient Position: Sitting)  Pulse 111  Temp 97.9  F (36.6  C) (Oral)  Wt 187 lb 12.8 oz (85.2 kg)  BMI 32.06 kg/m2  Body mass index is 32.06 kg/(m^2).  GENERAL: healthy, alert and no distress  MS: normal rom of both ankles, no pain or swelling on left ankle.  Right ankle slightly swollen over lateral side with pain below and above lateral malleolus         ASSESSMENT/PLAN:       1. Right foot pain  No fracture noted on xray.  Wear good supportive shoes and see podiatry.  Ok to use narcotics until she sees podiatry.  Discussed not using when driving and not to use with xanax.    - XR Foot Right G/E 3 Views; Future  - PODIATRY/FOOT & ANKLE SURGERY REFERRAL  - oxyCODONE-acetaminophen (PERCOCET) 5-325 MG per tablet; Take 1 tablet by mouth every 8 hours as needed for pain maximum 3 tablet(s) per day  Dispense: 21 tablet; Refill: 0    2. Anxiety  Uses occasionally. Ok  to refill.   If she is using more often again will need to restart daily medication.    - ALPRAZolam (XANAX) 0.5 MG tablet; Take 1 tablet (0.5 mg) by mouth 3 times daily as needed for anxiety  Dispense: 30 tablet; Refill: 0    3. Tobacco abuse disorder  Continue to work on quitting.    - nicotine (NICODERM CQ) 14 MG/24HR 24 hr patch; Place 1 patch onto the skin every 24 hours  Dispense: 30 patch; Refill: 1  - nicotine polacrilex (EQ NICOTINE POLACRILEX) 2 MG gum; Place 1 each (2 mg) inside cheek as needed for smoking cessation  Dispense: 30 tablet; Refill: 1    4. Encounter for surveillance of contraceptive pills    - norgestim-eth estrad triphasic (TRINESSA, 28,) 0.18/0.215/0.25 MG-35 MCG per tablet; Take 1 tablet by mouth daily May take continuously, having a period every 3 months.  Dispense: 90 tablet; Refill: 1    FUTURE APPOINTMENTS:       - Follow-up visit in 5 months for mood and pap    Olya Connors PA-C  [facility]

## 2017-12-08 NOTE — MR AVS SNAPSHOT
After Visit Summary   12/8/2017    Bita Alaniz    MRN: [id number]           Patient Information     Date Of Birth          1990        Visit Information        Provider Department      12/8/2017 7:40 AM Olya Connors PA-C [facility]        Today's Diagnoses     Right foot pain    -  1    Anxiety        Tobacco abuse disorder        Encounter for surveillance of contraceptive pills          Care Instructions    Don't percocet with xanax or when you are driving   See podiatry   Wear good supportive shoes all the time           Follow-ups after your visit        Additional Services     PODIATRY/FOOT & ANKLE SURGERY REFERRAL       Your provider has referred you to: [facility]: [facility] [phone]   [email or web address]    Please be aware that coverage of these services is subject to the terms and limitations of your health insurance plan.  Call member services at your health plan with any benefit or coverage questions.      Please bring the following to your appointment:  >>   Any x-rays, CTs or MRIs which have been performed.  Contact the facility where they were done to arrange for  prior to your scheduled appointment.    >>   List of current medications   >>   This referral request   >>   Any documents/labs given to you for this referral                  Follow-up notes from your care team     Return in about 5 months (around 5/8/2018) for Physical Exam.      Who to contact     If you have questions or need follow up information about today's clinic visit or your schedule please contact [facility] directly at [phone].  Normal or non-critical lab and imaging results will be communicated to you by MyChart, letter or phone within 4 business days after the clinic has received the results. If you do not hear from us within 7 days, please contact the clinic  through [employer] or phone. If you have a critical or abnormal lab result, we will notify you by phone as soon as possible.  Submit refill requests through [employer] or call your pharmacy and they will forward the refill request to us. Please allow 3 business days for your refill to be completed.          Additional Information About Your Visit        [employer] Information     [employer] gives you secure access to your electronic health record. If you see a primary care provider, you can also send messages to your care team and make appointments. If you have questions, please call your primary care clinic.  If you do not have a primary care provider, please call [phone] and they will assist you.        Care EveryWhere ID     This is your Care EveryWhere ID. This could be used by other organizations to access your [place] medical records  [id number]        Your Vitals Were     Pulse Temperature BMI (Body Mass Index)             111 97.9  F (36.6  C) (Oral) 32.06 kg/m2          Blood Pressure from Last 3 Encounters:   12/08/17 123/83   05/26/17 128/88   02/24/17 128/86    Weight from Last 3 Encounters:   12/08/17 187 lb 12.8 oz (85.2 kg)   05/26/17 177 lb 6.4 oz (80.5 kg)   02/24/17 169 lb (76.7 kg)              We Performed the Following     PODIATRY/FOOT & ANKLE SURGERY REFERRAL          Today's Medication Changes          These changes are accurate as of: 12/8/17  8:47 AM.  If you have any questions, ask your nurse or doctor.               Start taking these medicines.        Dose/Directions    nicotine 14 MG/24HR 24 hr patch   Commonly known as:  NICODERM CQ   Used for:  Tobacco abuse disorder   Started by:  Olya Connors PA-C        Dose:  1 patch   Place 1 patch onto the skin every 24 hours   Quantity:  30 patch   Refills:  1       nicotine polacrilex 2 MG gum   Commonly known as:  EQ NICOTINE POLACRILEX   Used for:  Tobacco abuse disorder   Started by:  Olya Connors PA-C        Dose:  2 mg    Place 1 each (2 mg) inside cheek as needed for smoking cessation   Quantity:  30 tablet   Refills:  1       oxyCODONE-acetaminophen 5-325 MG per tablet   Commonly known as:  PERCOCET   Used for:  Right foot pain   Started by:  Olya Connors PA-C        Dose:  1 tablet   Take 1 tablet by mouth every 8 hours as needed for pain maximum 3 tablet(s) per day   Quantity:  21 tablet   Refills:  0            Where to get your medicines      These medications were sent to [facility] #[id number] - [place] - [address] [address]     Phone:  [phone]     nicotine 14 MG/24HR 24 hr patch    nicotine polacrilex 2 MG gum    norgestim-eth estrad triphasic 0.18/0.215/0.25 MG-35 MCG per tablet         Some of these will need a paper prescription and others can be bought over the counter.  Ask your nurse if you have questions.     Bring a paper prescription for each of these medications     ALPRAZolam 0.5 MG tablet    oxyCODONE-acetaminophen 5-325 MG per tablet                Primary Care Provider Office Phone # Fax #    Olya Connors PA-C [phone]       [address] [facility] [address]        Equal Access to Services     MELISSA JORDAN AH: Hadii karrie ku hadasho Soomaali, waaxda luqadaha, qaybta kaalmada adeegyada, waxay riddhi hayimelda reed. So [facility] [phone].    ATENCIÓN: Si habla español, tiene a ovalle disposición servicios gratuitos de asistencia lingüística. Llame al [phone].    We comply with applicable federal civil rights laws and [place] laws. We do not discriminate on the basis of race, color, national origin, age, disability, sex, sexual orientation, or gender identity.            Thank you!     Thank you for choosing [facility]  for your care. Our goal is always to provide you with excellent care. Hearing back from our patients is one way we can continue to improve our services. Please  take a few minutes to complete the written survey that you may receive in the mail after your visit with us. Thank you!             Your Updated Medication List - Protect others around you: Learn how to safely use, store and throw away your medicines at www.disposemymeds.org.          This list is accurate as of: 12/8/17  8:47 AM.  Always use your most recent med list.                   Brand Name Dispense Instructions for use Diagnosis    ALPRAZolam 0.5 MG tablet    XANAX    30 tablet    Take 1 tablet (0.5 mg) by mouth 3 times daily as needed for anxiety    Anxiety       aluminum chloride 20 % external solution    DRYSOL    60 mL    Apply  topically At Bedtime. To improve effect, cover area of application with plastic wrap,  hold in place with tight shirt, and wash area in morning. As sweating improves, decrease use to 1-2 times weekly.    Sweating       ibuprofen 200 MG tablet    ADVIL/MOTRIN     Take 200 mg by mouth as needed. 2-3 TABLETS AT ONE TIME        Multi-vitamin Tabs tablet     100 tablet    Take 1 tablet by mouth daily    Anxiety       nicotine 14 MG/24HR 24 hr patch    NICODERM CQ    30 patch    Place 1 patch onto the skin every 24 hours    Tobacco abuse disorder       nicotine polacrilex 2 MG gum    EQ NICOTINE POLACRILEX    30 tablet    Place 1 each (2 mg) inside cheek as needed for smoking cessation    Tobacco abuse disorder       norgestim-eth estrad triphasic 0.18/0.215/0.25 MG-35 MCG per tablet    TRINESSA (28)    90 tablet    Take 1 tablet by mouth daily May take continuously, having a period every 3 months.    Encounter for surveillance of contraceptive pills       oxyCODONE-acetaminophen 5-325 MG per tablet    PERCOCET    21 tablet    Take 1 tablet by mouth every 8 hours as needed for pain maximum 3 tablet(s) per day    Right foot pain       podofilox 0.5 % external solution    CONDYLOX    59.5 mL    APPLY TOPICALLY 2 TIMES DAILY FOR 3 DAYS THEN STOP FOR 4 DAYS. IF NEEDED REPEAT 4 TIMES     Genital warts       TYLENOL 500 MG tablet   Generic drug:  acetaminophen      Take 1-2 tablets by mouth as needed. 1 TABLET AS NEEDED

## 2017-12-08 NOTE — PATIENT INSTRUCTIONS
Don't percocet with xanax or when you are driving   See podiatry   Wear good supportive shoes all the time

## 2017-12-09 ASSESSMENT — ANXIETY QUESTIONNAIRES: GAD7 TOTAL SCORE: 5

## 2017-12-15 ENCOUNTER — OFFICE VISIT (OUTPATIENT)
Dept: PODIATRY | Facility: CLINIC | Age: 27
End: 2017-12-15
Payer: COMMERCIAL

## 2017-12-15 VITALS — HEART RATE: 100 BPM | WEIGHT: 187 LBS | BODY MASS INDEX: 31.93 KG/M2 | OXYGEN SATURATION: 98 %

## 2017-12-15 DIAGNOSIS — M79.671 RIGHT FOOT PAIN: Primary | ICD-10-CM

## 2017-12-15 DIAGNOSIS — M21.6X1 ACQUIRED PRONATION DEFORMITY OF RIGHT ANKLE: ICD-10-CM

## 2017-12-15 PROCEDURE — 99243 OFF/OP CNSLTJ NEW/EST LOW 30: CPT | Performed by: PODIATRIST

## 2017-12-15 RX ORDER — OXYCODONE AND ACETAMINOPHEN 5; 325 MG/1; MG/1
1 TABLET ORAL EVERY 4 HOURS PRN
Qty: 18 TABLET | Refills: 0 | Status: SHIPPED | OUTPATIENT
Start: 2017-12-15 | End: 2018-01-02

## 2017-12-15 NOTE — MR AVS SNAPSHOT
After Visit Summary   12/15/2017    Bita Alaniz    MRN: [id number]           Patient Information     Date Of Birth          1990        Visit Information        Provider Department      12/15/2017 10:45 AM Rainer Cash DPM [facility]        Today's Diagnoses     Right foot pain    -  1    Acquired pronation deformity of right ankle          Care Instructions    We wish you continued good healing. If you have any questions or concerns, please do not hesitate to contact us at [phone]      Please remember to call and schedule a follow up appointment if one was recommended at your earliest convenience.   PODIATRY CLINIC HOURS  TELEPHONE NUMBER    Dr. Rainer RIVERAPNAZANIN [facility]    Clinics:  [facility]        Rosanne Viera MA  Medical Assistant  Tuesday 1PM-6PM  [place]  Wednesday 7AM-2PM  [place]  Thursday 10AM-6PM  [place]  Friday 7AM-345PM  [place]  Specialty schedulers:   [phone] to make an appointment with any Specialty Provider.        Urgent Care locations:    [facility] Monday-Friday 5 pm - 9 pm. Saturday-Sunday 9 am -5pm    Monday-Friday 11 am - 9 pm Saturday 9 am - 5 pm     Monday-Sunday 12 noon-8PM [phone]     If you need a medication refill, please contact us you may need lab work and/or a follow up visit prior to your refill (i.e. Antifungal medications).    [employer] (secure e-mail communication and access to your chart) to send a message or to make an appointment.    If MRI needed please call [facility] at [phone]        Weight management plan: Patient was referred to their PCP to discuss a diet and exercise plan.  SMOKING CESSATION    What's in cigarette smoke? - Cigarette smoke contains over 4,000 chemicals. Nicotine is one of the main ingredients which is an  insecticide/herbicide. It is poisonous to our nervous system, increases blood clotting risk, and decreases the body's defenses to fight off infection. Another chemical is Carbon Monoxide is an asphyxiating gas that permanently binds to blood cells and blocks the transport of oxygen. This leads to tissue death and decreases your metabolism. Tar is a chemical that coats your lungs and trachea which impairs new oxygen coming in and carbon dioxide getting out of your body.   How does smoking impact surgery? - Smoking is particularly hazardous with regards to surgery. Surgery puts stress on the body and a smoker's body is already under strain from these chemicals. Putting the two together, especially for an elective surgery, could be a recipe for disaster. Smoking before and after surgery increases your risk of heart problems, slow wound healing, delayed bone healing, blood clots, wound infection and anesthesia complications.   What are the benefits of quitting? - In 20 minutes your blood pressure will drop back down to normal. In 8 hours the carbon monoxide (a toxic gas) levels in your blood stream will drop by half, and oxygen levels will return to normal. In 48 hours your chance of having a heart attack will have decreased. All nicotine will have left your body. Your sense of taste and smell will return to a normal level. In 72 hours your bronchial tubes will relax, and your energy levels will increase. In 2 weeks your circulation will increase, and it will continue to improve for the next 10 weeks.    Recommendations for elective surgery - Ideally, patients should quit smoking 8 weeks before and at least 2 weeks after elective surgery in order to avoid complications. Simply cutting back on the amount of cigarettes smoked per day does not offer any benefit or decrease the risk of poor wound healing, heart problems, and infection. Smokers should also start taking Vitamin C and B for two weeks before surgery and two  weeks after surgery.    Ways to Stop Smokin. Nicotine patches, lozenges, or gum  2. Support Groups  3. Medications (see below)    List of Medications:  1. Varenicline Tartrate (CHANTIX)   2. Bupropion HCL (WELLBUTRIN, ZYBAN) - note: make sure Wellbutrin is for smoking cessation and not other issues   3. Nicotine Patch (NICODERM)   4. Nicotine Inhaler (NICOTROL)   5. Nicotine Gum Nicotine Polacrilex   6. Nicotine Lozenge: Nicotine Polacrilex (COMMIT)   * [place] offers a smoking support group as well!  Please visit: [email or web address]  If you are interested in these, ask about getting a prescription or talk to your primary care doctor about what may be the best way for you to quit.                 Follow-ups after your visit        Who to contact     If you have questions or need follow up information about today's clinic visit or your schedule please contact [facility] directly at [phone].  Normal or non-critical lab and imaging results will be communicated to you by [employer], letter or phone within 4 business days after the clinic has received the results. If you do not hear from us within 7 days, please contact the clinic through [employer] or phone. If you have a critical or abnormal lab result, we will notify you by phone as soon as possible.  Submit refill requests through [employer] or call your pharmacy and they will forward the refill request to us. Please allow 3 business days for your refill to be completed.          Additional Information About Your Visit        [employer] Information     [employer] gives you secure access to your electronic health record. If you see a primary care provider, you can also send messages to your care team and make appointments. If you have questions, please call your primary care clinic.  If you do not have a primary care provider, please call [phone] and they will assist you.        Care EveryWhere ID     This is your Care  EveryWhere ID. This could be used by other organizations to access your [place] medical records  [id number]        Your Vitals Were     Pulse Pulse Oximetry BMI (Body Mass Index)             100 98% 31.93 kg/m2          Blood Pressure from Last 3 Encounters:   12/08/17 123/83   05/26/17 128/88   02/24/17 128/86    Weight from Last 3 Encounters:   12/15/17 187 lb (84.8 kg)   12/08/17 187 lb 12.8 oz (85.2 kg)   05/26/17 177 lb 6.4 oz (80.5 kg)              Today, you had the following     No orders found for display         Today's Medication Changes          These changes are accurate as of: 12/15/17  1:04 PM.  If you have any questions, ask your nurse or doctor.               These medicines have changed or have updated prescriptions.        Dose/Directions    * oxyCODONE-acetaminophen 5-325 MG per tablet   Commonly known as:  PERCOCET   This may have changed:  Another medication with the same name was added. Make sure you understand how and when to take each.   Used for:  Right foot pain   Changed by:  Olya Connors PA-C        Dose:  1 tablet   Take 1 tablet by mouth every 8 hours as needed for pain maximum 3 tablet(s) per day   Quantity:  21 tablet   Refills:  0       * oxyCODONE-acetaminophen 5-325 MG per tablet   Commonly known as:  PERCOCET   This may have changed:  You were already taking a medication with the same name, and this prescription was added. Make sure you understand how and when to take each.   Used for:  Right foot pain   Changed by:  Rainer Cash DPM        Dose:  1 tablet   Take 1 tablet by mouth every 4 hours as needed for pain maximum 4 tablet(s) per day   Quantity:  18 tablet   Refills:  0       * Notice:  This list has 2 medication(s) that are the same as other medications prescribed for you. Read the directions carefully, and ask your doctor or other care provider to review them with you.         Where to get your medicines      Some of these will need a paper  prescription and others can be bought over the counter.  Ask your nurse if you have questions.     Bring a paper prescription for each of these medications     oxyCODONE-acetaminophen 5-325 MG per tablet                Primary Care Provider Office Phone # Fax #    Olya Connors PA-C [phone]       [address] [facility] [address]        Equal Access to Services     MELISSA JORDAN : Hadii aad ku hadasho Soomaali, waaxda luqadaha, qaybta kaalmada adeegyada, waxay idiin hayaan adeeg kharash la'imelda ah. So [facility] [phone].    ATENCIÓN: Si habla español, tiene a ovalle disposición servicios gratuitos de asistencia lingüística. Llame al [phone].    We comply with applicable federal civil rights laws and [place] laws. We do not discriminate on the basis of race, color, national origin, age, disability, sex, sexual orientation, or gender identity.            Thank you!     Thank you for choosing [facility]  for your care. Our goal is always to provide you with excellent care. Hearing back from our patients is one way we can continue to improve our services. Please take a few minutes to complete the written survey that you may receive in the mail after your visit with us. Thank you!             Your Updated Medication List - Protect others around you: Learn how to safely use, store and throw away your medicines at www.disposemymeds.org.          This list is accurate as of: 12/15/17  1:04 PM.  Always use your most recent med list.                   Brand Name Dispense Instructions for use Diagnosis    ALPRAZolam 0.5 MG tablet    XANAX    30 tablet    Take 1 tablet (0.5 mg) by mouth 3 times daily as needed for anxiety    Anxiety       aluminum chloride 20 % external solution    DRYSOL    60 mL    Apply  topically At Bedtime. To improve effect, cover area of application with plastic wrap,  hold in place with tight shirt, and wash area in morning. As sweating  improves, decrease use to 1-2 times weekly.    Sweating       ibuprofen 200 MG tablet    ADVIL/MOTRIN     Take 200 mg by mouth as needed. 2-3 TABLETS AT ONE TIME        Multi-vitamin Tabs tablet     100 tablet    Take 1 tablet by mouth daily    Anxiety       nicotine 14 MG/24HR 24 hr patch    NICODERM CQ    30 patch    Place 1 patch onto the skin every 24 hours    Tobacco abuse disorder       nicotine polacrilex 2 MG gum    EQ NICOTINE POLACRILEX    30 tablet    Place 1 each (2 mg) inside cheek as needed for smoking cessation    Tobacco abuse disorder       norgestim-eth estrad triphasic 0.18/0.215/0.25 MG-35 MCG per tablet    TRINESSA (28)    90 tablet    Take 1 tablet by mouth daily May take continuously, having a period every 3 months.    Encounter for surveillance of contraceptive pills       * oxyCODONE-acetaminophen 5-325 MG per tablet    PERCOCET    21 tablet    Take 1 tablet by mouth every 8 hours as needed for pain maximum 3 tablet(s) per day    Right foot pain       * oxyCODONE-acetaminophen 5-325 MG per tablet    PERCOCET    18 tablet    Take 1 tablet by mouth every 4 hours as needed for pain maximum 4 tablet(s) per day    Right foot pain       podofilox 0.5 % external solution    CONDYLOX    59.5 mL    APPLY TOPICALLY 2 TIMES DAILY FOR 3 DAYS THEN STOP FOR 4 DAYS. IF NEEDED REPEAT 4 TIMES    Genital warts       TYLENOL 500 MG tablet   Generic drug:  acetaminophen      Take 1-2 tablets by mouth as needed. 1 TABLET AS NEEDED        * Notice:  This list has 2 medication(s) that are the same as other medications prescribed for you. Read the directions carefully, and ask your doctor or other care provider to review them with you.

## 2017-12-15 NOTE — PATIENT INSTRUCTIONS
We wish you continued good healing. If you have any questions or concerns, please do not hesitate to contact us at [phone]      Please remember to call and schedule a follow up appointment if one was recommended at your earliest convenience.   PODIATRY CLINIC HOURS  TELEPHONE NUMBER    Dr. Rainer Cash D.P.M FAC FAS    Clinics:  [facility]        Rosanne Viera MA  Medical Assistant  Tuesday 1PM-6PM  [place]  Wednesday 7AM-2PM  [place]  Thursday 10AM-6PM  [place] Friday 7AM-345PM  [place]  Specialty schedulers:   [phone] to make an appointment with any Specialty Provider.        Urgent Care locations:    [facility] Monday-Friday 5 pm - 9 pm. Saturday-Sunday 9 am -5pm    Monday-Friday 11 am - 9 pm Saturday 9 am - 5 pm     Monday-Sunday 12 noon-8PM [phone]     If you need a medication refill, please contact us you may need lab work and/or a follow up visit prior to your refill (i.e. Antifungal medications).    [employer] (secure e-mail communication and access to your chart) to send a message or to make an appointment.    If MRI needed please call [facility] at [phone]        Weight management plan: Patient was referred to their PCP to discuss a diet and exercise plan.  SMOKING CESSATION    What's in cigarette smoke? - Cigarette smoke contains over 4,000 chemicals. Nicotine is one of the main ingredients which is an insecticide/herbicide. It is poisonous to our nervous system, increases blood clotting risk, and decreases the body's defenses to fight off infection. Another chemical is Carbon Monoxide is an asphyxiating gas that permanently binds to blood cells and blocks the transport of oxygen. This leads to tissue death and decreases your metabolism. Tar is a chemical that coats your lungs and trachea which impairs new oxygen coming in and  carbon dioxide getting out of your body.   How does smoking impact surgery? - Smoking is particularly hazardous with regards to surgery. Surgery puts stress on the body and a smoker's body is already under strain from these chemicals. Putting the two together, especially for an elective surgery, could be a recipe for disaster. Smoking before and after surgery increases your risk of heart problems, slow wound healing, delayed bone healing, blood clots, wound infection and anesthesia complications.   What are the benefits of quitting? - In 20 minutes your blood pressure will drop back down to normal. In 8 hours the carbon monoxide (a toxic gas) levels in your blood stream will drop by half, and oxygen levels will return to normal. In 48 hours your chance of having a heart attack will have decreased. All nicotine will have left your body. Your sense of taste and smell will return to a normal level. In 72 hours your bronchial tubes will relax, and your energy levels will increase. In 2 weeks your circulation will increase, and it will continue to improve for the next 10 weeks.    Recommendations for elective surgery - Ideally, patients should quit smoking 8 weeks before and at least 2 weeks after elective surgery in order to avoid complications. Simply cutting back on the amount of cigarettes smoked per day does not offer any benefit or decrease the risk of poor wound healing, heart problems, and infection. Smokers should also start taking Vitamin C and B for two weeks before surgery and two weeks after surgery.    Ways to Stop Smokin. Nicotine patches, lozenges, or gum  2. Support Groups  3. Medications (see below)    List of Medications:  1. Varenicline Tartrate (CHANTIX)   2. Bupropion HCL (WELLBUTRIN, ZYBAN)   note: make sure Wellbutrin is for smoking cessation and not other issues   3. Nicotine Patch (NICODERM)   4. Nicotine Inhaler (NICOTROL)   5. Nicotine Gum Nicotine Polacrilex   6. Nicotine Lozenge:  Nicotine Polacrilex (COMMIT)   * [place] offers a smoking support group as well!  Please visit: [email or web address]  If you are interested in these, ask about getting a prescription or talk to your primary care doctor about what may be the best way for you to quit.

## 2017-12-15 NOTE — LETTER
12/15/2017         RE: Bita Alaniz  [address] [facility] [address]        Dear Colleague,    Thank you for referring your patient, Bita Alaniz, to the [facility]. Please see a copy of my visit note below.    See dictation    Again, thank you for allowing me to participate in the care of your patient.        Sincerely,        Rainer Cash DPM

## 2017-12-16 NOTE — PROGRESS NOTES
DATE OF VISIT:  12/15/2017.       Bita Alaniz was referred to me by Olya Connors.  She has pain in the area of her right ankle.  She points to the sinus tarsi.  It is aggravated by activity and relieved by rest.  She has had pain in this foot for years; however, it has become worse over the last 3 weeks.  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  Her work shoes are about 10 months old.  She does not wear orthotics at all and has not had these in quite a long time.  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  The patient is a smoker.  She has been given Percocet for pain and is asking for some more today.    ROS:  Denies erythema, edema, weakness, numbness       Allergies   Allergen Reactions     Penicillins Hives     Fever     Tramadol Nausea     Vicodin [Hydrocodone-Acetaminophen] Nausea       Current Outpatient Prescriptions   Medication Sig Dispense Refill     oxyCODONE-acetaminophen (PERCOCET) 5-325 MG per tablet Take 1 tablet by mouth every 4 hours as needed for pain maximum 4 tablet(s) per day 18 tablet 0     nicotine (NICODERM CQ) 14 MG/24HR 24 hr patch Place 1 patch onto the skin every 24 hours 30 patch 1     nicotine polacrilex (EQ NICOTINE POLACRILEX) 2 MG gum Place 1 each (2 mg) inside cheek as needed for smoking cessation 30 tablet 1     norgestim-eth estrad triphasic (TRINESSA, 28,) 0.18/0.215/0.25 MG-35 MCG per tablet Take 1 tablet by mouth daily May take continuously, having a period every 3 months. 90 tablet 1     ALPRAZolam (XANAX) 0.5 MG tablet Take 1 tablet (0.5 mg) by mouth 3 times daily as needed for anxiety 30 tablet 0     oxyCODONE-acetaminophen (PERCOCET) 5-325 MG per tablet Take 1 tablet by mouth every 8 hours as needed for pain maximum 3 tablet(s) per day 21 tablet 0     podofilox (CONDYLOX) 0.5 % external solution APPLY TOPICALLY 2 TIMES DAILY FOR 3 DAYS THEN STOP FOR 4 DAYS. IF NEEDED REPEAT 4 TIMES 59.5 mL 3     multivitamin, therapeutic  with minerals (MULTI-VITAMIN) TABS tablet Take 1 tablet by mouth daily 100 tablet 3     aluminum chloride (DRYSOL) 20 % external solution Apply  topically At Bedtime. To improve effect, cover area of application with plastic wrap,  hold in place with tight shirt, and wash area in morning. As sweating improves, decrease use to 1-2 times weekly. 60 mL 3     ibuprofen (ADVIL,MOTRIN) 200 MG tablet Take 200 mg by mouth as needed. 2-3 TABLETS AT ONE TIME       acetaminophen (TYLENOL) 500 MG tablet Take 1-2 tablets by mouth as needed. 1 TABLET AS NEEDED         Patient Active Problem List   Diagnosis     PMS (premenstrual syndrome)     Low back pain     Menorrhagia     CARDIOVASCULAR SCREENING; LDL GOAL LESS THAN 160     Sweating     Anxiety     Contraception management     Genital warts     NATE III (cervical intraepithelial neoplasia III)     Tobacco abuse disorder       Past Medical History:   Diagnosis Date     ASCUS with positive high risk HPV 1/28/15, 6/20/16    colposcopy 6/2015     Depression     resolved     H/O colposcopy with cervical biopsy 02/24/2017    NATE 2/3, CIS     JRA (juvenile rheumatoid arthritis) (H) 1993    knees,feet      LBP (low back pain)      Menorrhagia 3/11/2010     Ovarian cyst      PMS (premenstrual syndrome)        Past Surgical History:   Procedure Laterality Date     C ORAL SURGERY PROCEDURE      wisdom teeth removed     LEEP TX, CERVICAL  05/26/2017 5/26/17 LEEP: NATE 3, neg margins. ECC: neg       Family History   Problem Relation Age of Onset     Musculoskeletal Disorder Mother      DIABETES Mother      Psychotic Disorder Mother      anxiety, ocd     Musculoskeletal Disorder Maternal Grandmother      CANCER Maternal Grandmother      lung     CANCER Paternal Grandmother      Unknown/Adopted Father        Social History   Substance Use Topics     Smoking status: Current Every Day Smoker     Packs/day: 0.75     Types: Cigarettes     Smokeless tobacco: Never Used      Comment: started at  age 15, not much second hand     Alcohol use Yes      Comment: occasionally             PHYSICAL EXAMINATION:   VITAL SIGNS:  Her pulse is 100, her SpO2 is 98%.  She weighs 187 pounds and has a BMI of 31.93.  She is somewhat tearful about her situation today.   EXTREMITIES:  She has palpable pedal pulses.  Capillary refill less than 3 seconds in all digits.  No ankle edema or varicosities noted.  Light touch intact in the digits.  Achilles reflexes are 2/4 bilaterally.  She has muscle strength 5/5 in all compartments.  No pain with stressing any muscle compartments.  Her skin is normal texture and turgor with hair growth noted.  Normal ROM of all forefoot and rearfoot joints bilateral.  Normal gait.  With weightbearing, she is pronated bilaterally, the right foot much more so than the left.  Left foot exam is unremarkable.  The right foot has a very mobile subtalar joint.  There is significant collapse and heel valgus.  There is pain on palpation of the area of her sinus tarsi.  There is none on the dorsum of the navicular or the cuneiforms.  None on the tarsometatarsal joint distally.  No compression of the calcaneal tubercle.        X-rays show signs consistent with a significantly pronated foot.      ASSESSMENT:     1.  Patient with bilateral pronation, right worse than left.     2.  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      PLAN:  I personally reviewed the x-rays.  I discussed with the patient because of her increased pronation and heel valgus she is getting stress in the area of her sinus tarsi.  She does not have her work shoe here today so we could not evaluate this.  I gave her instructions if her shoes are 10 months old, she probably needs a new one with a pronated foot.  I made some recommendations.  She may want to try some type of high top boot which may give her even more supportive.  We wrote her a prescription for custom orthotics.  We discussed over-the-counter orthotics.  We  tried to put an F8 ankle brace on her foot today; however, she said this did not work.  We tried a Cam walker normal and plantarflexed and even with an F8 ankle brace and none of these helped her.  We discussed a knee walker to try to get off her foot more.  However, she declines.  She is just going to try the shoes first.  We discussed an MRI to rule out any type of stress fracture; however, she declines today.  We wrote a prescription for Percocet 1 every 4 hours, maximum of 4 per day, dispensed 18.  We will have her RTC p.r.n.      Thank you for allowing me to participate in the care of this patient.         TRUE BERMUDEZ DPM             D: 12/15/2017 13:03   T: 12/15/2017 21:15   MT: TYRA      Name:     TK VICENTE   MRN:      [id number]        Account:      [id number]   :      1990           Visit Date:   12/15/2017      Document: [id number]       cc: Olya Connors PA-C

## 2018-01-02 ENCOUNTER — TELEPHONE (OUTPATIENT)
Dept: FAMILY MEDICINE | Facility: CLINIC | Age: 28
End: 2018-01-02

## 2018-01-02 DIAGNOSIS — M79.671 RIGHT FOOT PAIN: ICD-10-CM

## 2018-01-02 RX ORDER — OXYCODONE AND ACETAMINOPHEN 5; 325 MG/1; MG/1
1 TABLET ORAL EVERY 4 HOURS PRN
Qty: 18 TABLET | Refills: 0 | Status: SHIPPED | OUTPATIENT
Start: 2018-01-02 | End: 2018-05-16

## 2018-01-02 NOTE — TELEPHONE ENCOUNTER
Reason for call:  Patient reporting a symptom    Symptom or request: Patient calling about her right foot. She seen PCP last month. She has flat feet. She states it has gotten worse in the last month. She seen a podiatrist and was given a referral to have inserts made and has been unable to get those at this point. He also talked about possible imaging if things done improve. She states in the last 2 days the pain has increased and can barely walk at all. Patient does not have any pain medication left either. Patient is wondering what PCP advises. She is wondering if she should follow up with PCP or who she should see.    Duration (how long have symptoms been present): see above    Have you been treated for this before? Yes    Additional comments: Patient uses [facility]    Phone Number patient can be reached at:  Home number on file [phone] (home)    Best Time:  any    Can we leave a detailed message on this number:  YES    Call taken on 1/2/2018 at 11:47 AM by Ilsa Baum

## 2018-01-02 NOTE — TELEPHONE ENCOUNTER
Prescription of oxyCODONE-acetaminophen (PERCOCET) 5-325 MG per tablet was brought to the  and patient informed to .  Patient was frustrated by providers note about needing to use the inserts before further testing.  Informed that she can message podiatry to get their opinion too.

## 2018-01-02 NOTE — TELEPHONE ENCOUNTER
I can refill the medication once but she needs to get the inserts before any further work up is done.  IN team box.      Olya Connors PA-C

## 2018-01-05 ENCOUNTER — OFFICE VISIT (OUTPATIENT)
Dept: PODIATRY | Facility: CLINIC | Age: 28
End: 2018-01-05
Payer: COMMERCIAL

## 2018-01-05 VITALS — WEIGHT: 187 LBS | BODY MASS INDEX: 33.13 KG/M2 | HEIGHT: 63 IN

## 2018-01-05 DIAGNOSIS — M21.6X1 ACQUIRED PRONATION DEFORMITY OF RIGHT ANKLE: Primary | ICD-10-CM

## 2018-01-05 DIAGNOSIS — M79.671 RIGHT FOOT PAIN: ICD-10-CM

## 2018-01-05 PROCEDURE — 99213 OFFICE O/P EST LOW 20 MIN: CPT | Performed by: PODIATRIST

## 2018-01-05 NOTE — NURSING NOTE
"Chief Complaint   Patient presents with     Follow Up For     Foot Pain     Right foot       Initial Ht 5' 3\" (1.6 m)  Wt 187 lb (84.8 kg)  BMI 33.13 kg/m2 Estimated body mass index is 33.13 kg/(m^2) as calculated from the following:    Height as of this encounter: 5' 3\" (1.6 m).    Weight as of this encounter: 187 lb (84.8 kg).  Medication Reconciliation: complete  "

## 2018-01-05 NOTE — LETTER
1/5/2018         RE: Bita Alaniz  [address]   [facility] [address]        Dear Colleague,    Thank you for referring your patient, Bita Alaniz, to the [facility]. Please see a copy of my visit note below.    DATE OF VISIT:      12/15/2017   Bita Alaniz was referred to me by Olya Connors.  She has pain in the area of her right ankle.  She points to the sinus tarsi.  It is aggravated by activity and relieved by rest.  She has had pain in this foot for years; however, it has become worse over the last 3 weeks.  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  Her work shoes are about 10 months old.  She does not wear orthotics at all and has not had these in quite a long time.  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  The patient is a smoker.  She has been given Percocet for pain and is asking for some more today.    1/5/18  Patient has been on light duty at work and foot feeling better.  Getting orthotics in 3 days.  5 days ago on new years alexis she started having lots of pain and points to sinus tarsi.  Missed 2 days of work.  Now much better.       ROS:  Denies erythema, edema, weakness, numbness       Allergies   Allergen Reactions     Penicillins Hives     Fever     Tramadol Nausea     Vicodin [Hydrocodone-Acetaminophen] Nausea       Current Outpatient Prescriptions   Medication Sig Dispense Refill     oxyCODONE-acetaminophen (PERCOCET) 5-325 MG per tablet Take 1 tablet by mouth every 4 hours as needed for pain maximum 4 tablet(s) per day 18 tablet 0     nicotine (NICODERM CQ) 14 MG/24HR 24 hr patch Place 1 patch onto the skin every 24 hours 30 patch 1     nicotine polacrilex (EQ NICOTINE POLACRILEX) 2 MG gum Place 1 each (2 mg) inside cheek as needed for smoking cessation 30 tablet 1     norgestim-eth estrad triphasic (TRINESSA, 28,) 0.18/0.215/0.25 MG-35 MCG per tablet Take 1 tablet by mouth daily May take  continuously, having a period every 3 months. 90 tablet 1     ALPRAZolam (XANAX) 0.5 MG tablet Take 1 tablet (0.5 mg) by mouth 3 times daily as needed for anxiety 30 tablet 0     podofilox (CONDYLOX) 0.5 % external solution APPLY TOPICALLY 2 TIMES DAILY FOR 3 DAYS THEN STOP FOR 4 DAYS. IF NEEDED REPEAT 4 TIMES 59.5 mL 3     multivitamin, therapeutic with minerals (MULTI-VITAMIN) TABS tablet Take 1 tablet by mouth daily 100 tablet 3     aluminum chloride (DRYSOL) 20 % external solution Apply  topically At Bedtime. To improve effect, cover area of application with plastic wrap,  hold in place with tight shirt, and wash area in morning. As sweating improves, decrease use to 1-2 times weekly. 60 mL 3     ibuprofen (ADVIL,MOTRIN) 200 MG tablet Take 200 mg by mouth as needed. 2-3 TABLETS AT ONE TIME       acetaminophen (TYLENOL) 500 MG tablet Take 1-2 tablets by mouth as needed. 1 TABLET AS NEEDED         Patient Active Problem List   Diagnosis     PMS (premenstrual syndrome)     Low back pain     Menorrhagia     CARDIOVASCULAR SCREENING; LDL GOAL LESS THAN 160     Sweating     Anxiety     Contraception management     Genital warts     NATE III (cervical intraepithelial neoplasia III)     Tobacco abuse disorder       Past Medical History:   Diagnosis Date     ASCUS with positive high risk HPV 1/28/15, 6/20/16    colposcopy 6/2015     Depression     resolved     H/O colposcopy with cervical biopsy 02/24/2017    NATE 2/3, CIS     JRA (juvenile rheumatoid arthritis) (H) 1993    knees,feet      LBP (low back pain)      Menorrhagia 3/11/2010     Ovarian cyst      PMS (premenstrual syndrome)        Past Surgical History:   Procedure Laterality Date     C ORAL SURGERY PROCEDURE      wisdom teeth removed     LEEP TX, CERVICAL  05/26/2017 5/26/17 LEEP: NATE 3, neg margins. ECC: neg       Family History   Problem Relation Age of Onset     Musculoskeletal Disorder Mother      DIABETES Mother      Psychotic Disorder Mother       anxiety, ocd     Musculoskeletal Disorder Maternal Grandmother      CANCER Maternal Grandmother      lung     CANCER Paternal Grandmother      Unknown/Adopted Father        Social History   Substance Use Topics     Smoking status: Current Every Day Smoker     Packs/day: 0.75     Types: Cigarettes     Smokeless tobacco: Never Used      Comment: started at age 15, not much second hand     Alcohol use Yes      Comment: occasionally             PHYSICAL EXAMINATION:   VITAL SIGNS:  Her pulse is 100, her SpO2 is 98%.  She weighs 187 pounds and has a BMI of 31.93.  She is somewhat tearful about her situation today.   EXTREMITIES:  She has palpable pedal pulses.  Capillary refill less than 3 seconds in all digits.  No ankle edema or varicosities noted.  Light touch intact in the digits.  Achilles reflexes are 2/4 bilaterally.  She has muscle strength 5/5 in all compartments.  No pain with stressing any muscle compartments.  Her skin is normal texture and turgor with hair growth noted.  Normal ROM of all forefoot and rearfoot joints bilateral.  Normal gait.  With weightbearing, she is pronated bilaterally, the right foot much more so than the left.  Left foot exam is unremarkable.  The right foot has a very mobile subtalar joint.  There is significant collapse and heel valgus.  There is pain on palpation of the area of her sinus tarsi, but this is less than last visit and there is no edema.  There is none on the dorsum of the navicular or the cuneiforms.  None on the tarsometatarsal joint distally.  No compression of the calcaneal tubercle.        X-rays show signs consistent with a significantly pronated foot.      ASSESSMENT:     1.  Patient with bilateral pronation, right worse than left.     2.  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      PLAN:  Patient  seems much better now.  When she gets orthotics Monday will wear these is a good stiff shoes at all times.  Discussed  [employer] [employer].  Discussed  she should should have mostly sitting job.  Return to clinic prn.                   TRUE CASH DPM                Again, thank you for allowing me to participate in the care of your patient.        Sincerely,        True Cash DPM

## 2018-01-05 NOTE — PROGRESS NOTES
DATE OF VISIT:      12/15/2017   Bita Alaniz was referred to me by Olya Connors.  She has pain in the area of her right ankle.  She points to the sinus tarsi.  It is aggravated by activity and relieved by rest.  She has had pain in this foot for years; however, it has become worse over the last 3 weeks.  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  Her work shoes are about 10 months old.  She does not wear orthotics at all and has not had these in quite a long time.  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  The patient is a smoker.  She has been given Percocet for pain and is asking for some more today.    1/5/18  Patient has been on light duty at work and foot feeling better.  Getting orthotics in 3 days.  5 days ago on new years alexis she started having lots of pain and points to sinus tarsi.  Missed 2 days of work.  Now much better.       ROS:  Denies erythema, edema, weakness, numbness       Allergies   Allergen Reactions     Penicillins Hives     Fever     Tramadol Nausea     Vicodin [Hydrocodone-Acetaminophen] Nausea       Current Outpatient Prescriptions   Medication Sig Dispense Refill     oxyCODONE-acetaminophen (PERCOCET) 5-325 MG per tablet Take 1 tablet by mouth every 4 hours as needed for pain maximum 4 tablet(s) per day 18 tablet 0     nicotine (NICODERM CQ) 14 MG/24HR 24 hr patch Place 1 patch onto the skin every 24 hours 30 patch 1     nicotine polacrilex (EQ NICOTINE POLACRILEX) 2 MG gum Place 1 each (2 mg) inside cheek as needed for smoking cessation 30 tablet 1     norgestim-eth estrad triphasic (TRINESSA, 28,) 0.18/0.215/0.25 MG-35 MCG per tablet Take 1 tablet by mouth daily May take continuously, having a period every 3 months. 90 tablet 1     ALPRAZolam (XANAX) 0.5 MG tablet Take 1 tablet (0.5 mg) by mouth 3 times daily as needed for anxiety 30 tablet 0     podofilox (CONDYLOX) 0.5 % external solution APPLY TOPICALLY 2 TIMES DAILY FOR 3 DAYS THEN  STOP FOR 4 DAYS. IF NEEDED REPEAT 4 TIMES 59.5 mL 3     multivitamin, therapeutic with minerals (MULTI-VITAMIN) TABS tablet Take 1 tablet by mouth daily 100 tablet 3     aluminum chloride (DRYSOL) 20 % external solution Apply  topically At Bedtime. To improve effect, cover area of application with plastic wrap,  hold in place with tight shirt, and wash area in morning. As sweating improves, decrease use to 1-2 times weekly. 60 mL 3     ibuprofen (ADVIL,MOTRIN) 200 MG tablet Take 200 mg by mouth as needed. 2-3 TABLETS AT ONE TIME       acetaminophen (TYLENOL) 500 MG tablet Take 1-2 tablets by mouth as needed. 1 TABLET AS NEEDED         Patient Active Problem List   Diagnosis     PMS (premenstrual syndrome)     Low back pain     Menorrhagia     CARDIOVASCULAR SCREENING; LDL GOAL LESS THAN 160     Sweating     Anxiety     Contraception management     Genital warts     NATE III (cervical intraepithelial neoplasia III)     Tobacco abuse disorder       Past Medical History:   Diagnosis Date     ASCUS with positive high risk HPV 1/28/15, 6/20/16    colposcopy 6/2015     Depression     resolved     H/O colposcopy with cervical biopsy 02/24/2017    NATE 2/3, CIS     JRA (juvenile rheumatoid arthritis) (H) 1993    knees,feet      LBP (low back pain)      Menorrhagia 3/11/2010     Ovarian cyst      PMS (premenstrual syndrome)        Past Surgical History:   Procedure Laterality Date     C ORAL SURGERY PROCEDURE      wisdom teeth removed     LEEP TX, CERVICAL  05/26/2017 5/26/17 LEEP: NATE 3, neg margins. ECC: neg       Family History   Problem Relation Age of Onset     Musculoskeletal Disorder Mother      DIABETES Mother      Psychotic Disorder Mother      anxiety, ocd     Musculoskeletal Disorder Maternal Grandmother      CANCER Maternal Grandmother      lung     CANCER Paternal Grandmother      Unknown/Adopted Father        Social History   Substance Use Topics     Smoking status: Current Every Day Smoker     Packs/day: 0.75      Types: Cigarettes     Smokeless tobacco: Never Used      Comment: started at age 15, not much second hand     Alcohol use Yes      Comment: occasionally             PHYSICAL EXAMINATION:   VITAL SIGNS:  Her pulse is 100, her SpO2 is 98%.  She weighs 187 pounds and has a BMI of 31.93.  She is somewhat tearful about her situation today.   EXTREMITIES:  She has palpable pedal pulses.  Capillary refill less than 3 seconds in all digits.  No ankle edema or varicosities noted.  Light touch intact in the digits.  Achilles reflexes are 2/4 bilaterally.  She has muscle strength 5/5 in all compartments.  No pain with stressing any muscle compartments.  Her skin is normal texture and turgor with hair growth noted.  Normal ROM of all forefoot and rearfoot joints bilateral.  Normal gait.  With weightbearing, she is pronated bilaterally, the right foot much more so than the left.  Left foot exam is unremarkable.  The right foot has a very mobile subtalar joint.  There is significant collapse and heel valgus.  There is pain on palpation of the area of her sinus tarsi, but this is less than last visit and there is no edema.  There is none on the dorsum of the navicular or the cuneiforms.  None on the tarsometatarsal joint distally.  No compression of the calcaneal tubercle.        X-rays show signs consistent with a significantly pronated foot.      ASSESSMENT:     1.  Patient with bilateral pronation, right worse than left.     2.  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      PLAN:  Patient  seems much better now.  When she gets orthotics Monday will wear these is a good stiff shoes at all times.  Discussed  [employer].  Discussed she should should have mostly sitting job.  Return to clinic prn.                   TRUE BERMUDEZ DPM

## 2018-01-05 NOTE — MR AVS SNAPSHOT
After Visit Summary   1/5/2018    Bita Alaniz    MRN: [id number]           Patient Information     Date Of Birth          1990        Visit Information        Provider Department      1/5/2018 3:00 PM Rainer Cash DPM [facility]        Today's Diagnoses     Acquired pronation deformity of right ankle    -  1    Right foot pain          Care Instructions        Weight management plan: Patient was referred to their PCP to discuss a diet and exercise plan.    SMOKING CESSATION  What's in cigarette smoke? - Cigarette smoke contains over 4,000 chemicals. Nicotine is one of the main ingredients which is an insecticide/herbicide. It is poisonous to our nervous system, increases blood clotting risk, and decreases the body's defenses to fight off infection. Another chemical is Carbon Monoxide is an asphyxiating gas that permanently binds to blood cells and blocks the transport of oxygen. This leads to tissue death and decreases your metabolism. Tar is a chemical that coats your lungs and trachea which impairs new oxygen coming in and carbon dioxide getting out of your body.   How does smoking impact surgery? - Smoking is particularly hazardous with regards to surgery. Surgery puts stress on the body and a smoker's body is already under strain from these chemicals. Putting the two together, especially for an elective surgery, could be a recipe for disaster. Smoking before and after surgery increases your risk of heart problems, slow wound healing, delayed bone healing, blood clots, wound infection and anesthesia complications.   What are the benefits of quitting? - In 20 minutes your blood pressure will drop back down to normal. In 8 hours the carbon monoxide (a toxic gas) levels in your blood stream will drop by half, and oxygen levels will return to normal. In 48 hours your chance of having a heart attack will have decreased. All nicotine will have left your body. Your  sense of taste and smell will return to a normal level. In 72 hours your bronchial tubes will relax, and your energy levels will increase. In 2 weeks your circulation will increase, and it will continue to improve for the next 10 weeks.    Recommendations for elective surgery - Ideally, patients should quit smoking 8 weeks before and at least 2 weeks after elective surgery in order to avoid complications. Simply cutting back on the amount of cigarettes smoked per day does not offer any benefit or decrease the risk of poor wound healing, heart problems, and infection. Smokers should also start taking Vitamin C and B for two weeks before surgery and two weeks after surgery.    Ways to Stop Smokin. Nicotine patches, lozenges, or gum  2. Support Groups  3. Medications (see below)    List of Medications:  1. Varenicline Tartrate (CHANTIX)   2. Bupropion HCL (WELLBUTRIN, ZYBAN) - note: make sure Wellbutrin is for smoking cessation and not other issues   3. Nicotine Patch (NICODERM)   4. Nicotine Inhaler (NICOTROL)   5. Nicotine Gum Nicotine Polacrilex   6. Nicotine Lozenge: Nicotine Polacrilex (COMMIT)   * [place] offers a smoking support group as well!  Please visit: [email or web address]  If you are interested in these, ask about getting a prescription or talk to your primary care doctor about what may be the best way for you to quit.                 Follow-ups after your visit        Who to contact     If you have questions or need follow up information about today's clinic visit or your schedule please contact [facility] directly at [phone].  Normal or non-critical lab and imaging results will be communicated to you by MyChart, letter or phone within 4 business days after the clinic has received the results. If you do not hear from us within 7 days, please contact the clinic through MyChart or phone. If you have a critical or abnormal lab result, we will notify you  "by phone as soon as possible.  Submit refill requests through [employer] or call your pharmacy and they will forward the refill request to us. Please allow 3 business days for your refill to be completed.          Additional Information About Your Visit        The [facility] Information     [employer] gives you secure access to your electronic health record. If you see a primary care provider, you can also send messages to your care team and make appointments. If you have questions, please call your primary care clinic.  If you do not have a primary care provider, please call [phone] and they will assist you.        Care EveryWhere ID     This is your Care EveryWhere ID. This could be used by other organizations to access your [facility] medical records  [id number]        Your Vitals Were     Height BMI (Body Mass Index)                5' 3\" (1.6 m) 33.13 kg/m2           Blood Pressure from Last 3 Encounters:   12/08/17 123/83   05/26/17 128/88   02/24/17 128/86    Weight from Last 3 Encounters:   01/05/18 187 lb (84.8 kg)   12/15/17 187 lb (84.8 kg)   12/08/17 187 lb 12.8 oz (85.2 kg)              Today, you had the following     No orders found for display       Primary Care Provider Office Phone # Fax #    Olya Connors PA-C [phone]       [address] [facility] [address]        Equal Access to Services     MELISSA JORDAN : Hadii karrie ku hadasho Soomaali, waaxda luqadaha, qaybta kaalmada adeegyada, thiago lazo . So [facility] [phone].    ATENCIÓN: Si habla español, tiene a ovalle disposición servicios gratuitos de asistencia lingüística. Cameron al [phone].    We comply with applicable federal civil rights laws and [place] laws. We do not discriminate on the basis of race, color, national origin, age, disability, sex, sexual orientation, or gender identity.            Thank you!     Thank you for choosing [facility]  for your care. " Our goal is always to provide you with excellent care. Hearing back from our patients is one way we can continue to improve our services. Please take a few minutes to complete the written survey that you may receive in the mail after your visit with us. Thank you!             Your Updated Medication List - Protect others around you: Learn how to safely use, store and throw away your medicines at www.disposemymeds.org.          This list is accurate as of: 1/5/18  3:52 PM.  Always use your most recent med list.                   Brand Name Dispense Instructions for use Diagnosis    ALPRAZolam 0.5 MG tablet    XANAX    30 tablet    Take 1 tablet (0.5 mg) by mouth 3 times daily as needed for anxiety    Anxiety       aluminum chloride 20 % external solution    DRYSOL    60 mL    Apply  topically At Bedtime. To improve effect, cover area of application with plastic wrap,  hold in place with tight shirt, and wash area in morning. As sweating improves, decrease use to 1-2 times weekly.    Sweating       ibuprofen 200 MG tablet    ADVIL/MOTRIN     Take 200 mg by mouth as needed. 2-3 TABLETS AT ONE TIME        Multi-vitamin Tabs tablet     100 tablet    Take 1 tablet by mouth daily    Anxiety       nicotine 14 MG/24HR 24 hr patch    NICODERM CQ    30 patch    Place 1 patch onto the skin every 24 hours    Tobacco abuse disorder       nicotine polacrilex 2 MG gum    EQ NICOTINE POLACRILEX    30 tablet    Place 1 each (2 mg) inside cheek as needed for smoking cessation    Tobacco abuse disorder       norgestim-eth estrad triphasic 0.18/0.215/0.25 MG-35 MCG per tablet    TRINESSA (28)    90 tablet    Take 1 tablet by mouth daily May take continuously, having a period every 3 months.    Encounter for surveillance of contraceptive pills       oxyCODONE-acetaminophen 5-325 MG per tablet    PERCOCET    18 tablet    Take 1 tablet by mouth every 4 hours as needed for pain maximum 4 tablet(s) per day    Right foot pain       podofilox  0.5 % external solution    CONDYLOX    59.5 mL    APPLY TOPICALLY 2 TIMES DAILY FOR 3 DAYS THEN STOP FOR 4 DAYS. IF NEEDED REPEAT 4 TIMES    Genital warts       TYLENOL 500 MG tablet   Generic drug:  acetaminophen      Take 1-2 tablets by mouth as needed. 1 TABLET AS NEEDED

## 2018-01-23 ENCOUNTER — TELEPHONE (OUTPATIENT)
Dept: FAMILY MEDICINE | Facility: CLINIC | Age: 28
End: 2018-01-23

## 2018-01-23 DIAGNOSIS — M79.671 RIGHT FOOT PAIN: Primary | ICD-10-CM

## 2018-01-23 RX ORDER — GABAPENTIN 300 MG/1
CAPSULE ORAL
Qty: 90 CAPSULE | Refills: 0 | Status: SHIPPED | OUTPATIENT
Start: 2018-01-23 | End: 2018-04-11

## 2018-01-23 NOTE — TELEPHONE ENCOUNTER
"Reason for call:  Patient reporting a symptom    Symptom or request: right foot pain    Duration (how long have symptoms been present): 1 month +    Have you been treated for this before? Yes    Additional comments: Patient calling, this is an ongoing issue.  She has flat feet and has seen PCP and Podiatry for this issue.  She is scheduled to get inserts on Friday, but is in pain.  Has been taking Tylenol \"like it's candy.\"  Patient is frustrated and in pain, has a job that required her to be on her feet for 10 hours.  Looking for advise.  Pharmacy pended, please call.    Phone Number patient can be reached at:  Home number on file [phone] (home)    Best Time:  any    Can we leave a detailed message on this number:  YES    Call taken on 1/23/2018 at 12:49 PM by Bettye Macdonald    "

## 2018-01-23 NOTE — TELEPHONE ENCOUNTER
Attempted to call patient at home number, left message on answering service requesting call back to clinic to discuss.  Jennie Main RN  [facility]

## 2018-01-23 NOTE — TELEPHONE ENCOUNTER
Patient returned call, I advised her of Rx sent and advised she call with any adverse reactions or other concerns.   Asked her to call in a few weeks to let us know how it is going (unsure if needed phone or office follow up).    Patient verbalized understanding of and agreement with plan.    Jennie Main RN  [facility]

## 2018-01-23 NOTE — TELEPHONE ENCOUNTER
[phone] ([place])    Bita Alaniz is a 27 year old female who calls with right foot pain.  Routing to provider to review and advise.     NURSING ASSESSMENT:  ADMITS: does wrap with ACE per instructions from podiatry. Swelling (looks different than the left foot), pain while walking.   DENIES: deformity, cold or blue compared with other foot, inability to walk or move foot, fever, red,swollen toe joints, diabetic, red swollen painful area on sole of foot,     Description:  Top of right foot by ankle on outside, hurts all the time, sore to touch, when puts weight on it gets worse.  Onset/duration:  1.5 to 2 months  Precip. 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 Yes  Taking over the counter medication(s?) Tylenol  Any medication side effects? No significant side effects    Any barriers to taking medication(s) as prescribed?  No  Medication(s) improving/managing symptoms?  No  Medication reconciliation completed: No      NURSING PLAN: Routed to provider Yes    RECOMMENDED DISPOSITION:  Home care advice - elevation, rest, alternate ice/heat, soak in epsom salt, continue with ACE wrap,   Will comply with recommendation: Yes  If further questions/concerns or if symptoms do not improve, worsen or new symptoms develop, call your PCP or [facility] as soon as possible.      Guideline used: foot problems page 257 to 260   Telephone Triage Protocols for  Nurses, Fifth Edition, Ayana Gudino, RN

## 2018-04-11 ENCOUNTER — OFFICE VISIT (OUTPATIENT)
Dept: PODIATRY | Facility: CLINIC | Age: 28
End: 2018-04-11
Payer: COMMERCIAL

## 2018-04-11 VITALS
SYSTOLIC BLOOD PRESSURE: 124 MMHG | WEIGHT: 185 LBS | TEMPERATURE: 98.8 F | HEIGHT: 63 IN | DIASTOLIC BLOOD PRESSURE: 88 MMHG | BODY MASS INDEX: 32.78 KG/M2 | HEART RATE: 96 BPM

## 2018-04-11 DIAGNOSIS — M21.41 PES PLANUS OF RIGHT FOOT: ICD-10-CM

## 2018-04-11 DIAGNOSIS — M79.671 RIGHT FOOT PAIN: Primary | ICD-10-CM

## 2018-04-11 PROCEDURE — 99214 OFFICE O/P EST MOD 30 MIN: CPT | Performed by: PODIATRIST

## 2018-04-11 RX ORDER — MELOXICAM 7.5 MG/1
7.5 TABLET ORAL DAILY PRN
Qty: 30 TABLET | Refills: 1 | Status: SHIPPED | OUTPATIENT
Start: 2018-04-11 | End: 2018-06-06

## 2018-04-11 NOTE — PATIENT INSTRUCTIONS
PRICE Therapy    Many aches and pains throughout the foot and ankle can be helped with many simple treatments.  This is usually described as PRICE Therapy.      P - Protection - often times, inflammation/pain in the lower extremity is not able to improve simply because the areas involved are never allowed to rest.  Every step we take can bother the problematic area.  Protecting those areas is an important step in the healing process.  This may involve a walking cast boot, a special insert/orthotic device, an ankle brace, or simply avoiding barefoot walking.    R - Rest - in addition to protecting the foot/ankle, resting is an important, but often times difficult, treatment option.  Getting off your feet when they bother you, and specifically avoiding activities that cause pain/discomfort, are very beneficial to prevent, and treat, foot/ankle pain.      I - Ice - icing regularly can help to decrease inflammation and swelling in the foot, thus decreasing pain.  Using an ice pack or a bag of frozen peas works very well.  Ice for 20 minutes multiple times per day as needed.  Do not place the ice directly on the skin as this can cause tissue damage.    C - Compression - using a compression wrap or an ACE wrap can help to decrease swelling, which can help to decrease pain.  Wearing the wraps is generally not needed at night, but they should be worn on a regular basis when you are going to be on your feet for prolonged periods as gravity tends to pull fluids down to your feet/ankles.    E - Elevation - elevating your lower extremities multiple times daily for 15-20 minutes can help to decrease swelling, which works well in decreasing pain levels.      Seek immediate care for any of these symptoms when taking Mobic:  Abdominal pain, Heartburn, Red stools, Maroon stools, Rectal bleeding, Constipation, Diarrhea      FLAT FEET   Flatfoot is often a complex disorder, with diverse symptoms and varying degrees of deformity and  disability. There are several types of flatfoot, all of which have one characteristic in common: partial or total collapse (loss) of the arch.  Other characteristics shared by most types of flatfoot include:   Toe drift,  in which the toes and front part of the foot point outward   The heel tilts toward the outside and the ankle appears to turn in   A tight Achilles tendon, which causes the heel to lift off the ground earlier when walking and may make the problem worse   Bunions and hammertoes may develop as a result of a flatfoot.   Flexible Flatfoot  Flexible flatfoot is one of the most common types of flatfoot. It typically begins in childhood or adolescence and continues into adulthood. It usually occurs in both feet and progresses in severity throughout the adult years. As the deformity worsens, the soft tissues (tendons and ligaments) of the arch may stretch or tear and can become inflamed.  The term  flexible  means that while the foot is flat when standing (weight-bearing), the arch returns when not standing.  SYMPTOMS  Pain in the heel, arch, ankle, or along the outside of the foot    Rolled-in  ankle (over-pronation)   Pain along the shin bone (shin splint)   General aching or fatigue in the foot or leg   Low back, hip or knee pain.   DIAGNOSIS  In diagnosing flatfoot, the foot and ankle surgeon examines the foot and observes how it looks when you stand and sit. X-rays are usually taken to determine the severity of the disorder. If you are diagnosed with flexible flatfoot but you don t have any symptoms, your surgeon will explain what you might expect in the future.  NON-SURGICAL TREATMENT  If you experience symptoms with flexible flatfoot, the surgeon may recommend non-surgical treatment options, including:  Activity modifications. Cut down on activities that bring you pain and avoid prolonged walking and standing to give your arches a rest.   Weight loss. If you are overweight, try to lose weight.  Putting too much weight on your arches may aggravate your symptoms.   Orthotic devices. Your foot and ankle surgeon can provide you with custom orthotic devices for your shoes to give more support to the arches.   Immobilization. In some cases, it may be necessary to use a walking cast or to completely avoid weight-bearing.   Medications. Nonsteroidal anti-inflammatory drugs (NSAIDs), such as ibuprofen, help reduce pain and inflammation.   Physical therapy. Ultrasound therapy or other physical therapy modalities may be used to provide temporary relief.   Shoe modifications. Wearing shoes that support the arches is important for anyone who has flatfoot.   SURGICAL TREATMENT  In some patients whose pain is not adequately relieved by other treatments, surgery may be considered. A variety of surgical techniques is available to correct flexible flatfoot, and one or a combination of procedures may be required to relieve the symptoms and improve foot function.  In selecting the procedure or combination of procedures for your particular case, the foot and ankle surgeon will take into consideration the extent of your deformity based on the x-ray findings, your age, your activity level, and other factors. The length of the recovery period will vary, depending on the procedure or procedures performed.            Body Mass Index (BMI)  Many things can cause foot and ankle problems. Foot structure, activity level, foot mechanics and injuries are common causes of pain.  One very important issue that often goes unmentioned is body weight. Extra weight can cause increased stress on muscles, ligaments, bones and tendons. Sometimes just a few extra pounds is all it takes to put one over her/his threshold. Without reducing that stress, it can be difficult to alleviate pain.   Some people are uncomfortable addressing this issue, but we feel it is important for you to think about it. As Foot & Ankle specialists, our job is addressing  the lower extremity problem and possible causes.   Regarding extra body weight, we encourage patients to discuss diet and weight management plans with their primary care doctors. It is this team approach that gives you the best opportunity for pain relief and getting you back on your feet.     SMOKING CESSATION  What's in cigarette smoke? - Cigarette smoke contains over 4,000 chemicals. Nicotine is one of the main ingredients which is an insecticide/herbicide. It is poisonous to our nervous system, increases blood clotting risk, and decreases the body's defenses to fight off infection. Another chemical is Carbon Monoxide is an asphyxiating gas that permanently binds to blood cells and blocks the transport of oxygen. This leads to tissue death and decreases your metabolism. Tar is a chemical that coats your lungs and trachea which impairs new oxygen coming in and carbon dioxide getting out of your body.   How does smoking impact surgery? - Smoking is particularly hazardous with regards to surgery. Surgery puts stress on the body and a smoker's body is already under strain from these chemicals. Putting the two together, especially for an elective surgery, could be a recipe for disaster. Smoking before and after surgery increases your risk of heart problems, slow wound healing, delayed bone healing, blood clots, wound infection and anesthesia complications.   What are the benefits of quitting? - In 20 minutes your blood pressure will drop back down to normal. In 8 hours the carbon monoxide (a toxic gas) levels in your blood stream will drop by half, and oxygen levels will return to normal. In 48 hours your chance of having a heart attack will have decreased. All nicotine will have left your body. Your sense of taste and smell will return to a normal level. In 72 hours your bronchial tubes will relax, and your energy levels will increase. In 2 weeks your circulation will increase, and it will continue to improve for the  next 10 weeks.    Recommendations for elective surgery - Ideally, patients should quit smoking 8 weeks before and at least 2 weeks after elective surgery in order to avoid complications. Simply cutting back on the amount of cigarettes smoked per day does not offer any benefit or decrease the risk of poor wound healing, heart problems, and infection. Smokers should also start taking Vitamin C and B for two weeks before surgery and two weeks after surgery.    Ways to Stop Smokin. Nicotine patches, lozenges, or gum  2. Support Groups  3. Medications (see below)    List of Medications:  1. Varenicline Tartrate (CHANTIX)   2. Bupropion HCL (WELLBUTRIN, ZYBAN)   note: make sure Wellbutrin is for smoking cessation and not other issues   3. Nicotine Patch (NICODERM)   4. Nicotine Inhaler (NICOTROL)   5. Nicotine Gum Nicotine Polacrilex   6. Nicotine Lozenge: Nicotine Polacrilex (COMMIT)   * [place] offers a smoking support group as well!  Please visit: [email or web address]  If you are interested in these, ask about getting a prescription or talk to your primary care doctor about what may be the best way for you to quit.

## 2018-04-11 NOTE — MR AVS SNAPSHOT
After Visit Summary   4/11/2018    Bita Alaniz    MRN: [id number]           Patient Information     Date Of Birth          1990        Visit Information        Provider Department      4/11/2018 11:30 AM Juancarlos Booth DPM [facility]        Today's Diagnoses     Right foot pain    -  1    Pes planus of right foot          Care Instructions    PRICE Therapy    Many aches and pains throughout the foot and ankle can be helped with many simple treatments.  This is usually described as PRICE Therapy.      P - Protection - often times, inflammation/pain in the lower extremity is not able to improve simply because the areas involved are never allowed to rest.  Every step we take can bother the problematic area.  Protecting those areas is an important step in the healing process.  This may involve a walking cast boot, a special insert/orthotic device, an ankle brace, or simply avoiding barefoot walking.    R - Rest - in addition to protecting the foot/ankle, resting is an important, but often times difficult, treatment option.  Getting off your feet when they bother you, and specifically avoiding activities that cause pain/discomfort, are very beneficial to prevent, and treat, foot/ankle pain.      I - Ice - icing regularly can help to decrease inflammation and swelling in the foot, thus decreasing pain.  Using an ice pack or a bag of frozen peas works very well.  Ice for 20 minutes multiple times per day as needed.  Do not place the ice directly on the skin as this can cause tissue damage.    C - Compression - using a compression wrap or an ACE wrap can help to decrease swelling, which can help to decrease pain.  Wearing the wraps is generally not needed at night, but they should be worn on a regular basis when you are going to be on your feet for prolonged periods as gravity tends to pull fluids down to your feet/ankles.    E - Elevation - elevating your lower  extremities multiple times daily for 15-20 minutes can help to decrease swelling, which works well in decreasing pain levels.      Seek immediate care for any of these symptoms when taking Mobic:  Abdominal pain, Heartburn, Red stools, Maroon stools, Rectal bleeding, Constipation, Diarrhea      FLAT FEET   Flatfoot is often a complex disorder, with diverse symptoms and varying degrees of deformity and disability. There are several types of flatfoot, all of which have one characteristic in common: partial or total collapse (loss) of the arch.  Other characteristics shared by most types of flatfoot include:   Toe drift,  in which the toes and front part of the foot point outward   The heel tilts toward the outside and the ankle appears to turn in   A tight Achilles tendon, which causes the heel to lift off the ground earlier when walking and may make the problem worse   Bunions and hammertoes may develop as a result of a flatfoot.   Flexible Flatfoot  Flexible flatfoot is one of the most common types of flatfoot. It typically begins in childhood or adolescence and continues into adulthood. It usually occurs in both feet and progresses in severity throughout the adult years. As the deformity worsens, the soft tissues (tendons and ligaments) of the arch may stretch or tear and can become inflamed.  The term  flexible  means that while the foot is flat when standing (weight-bearing), the arch returns when not standing.  SYMPTOMS  Pain in the heel, arch, ankle, or along the outside of the foot    Rolled-in  ankle (over-pronation)   Pain along the shin bone (shin splint)   General aching or fatigue in the foot or leg   Low back, hip or knee pain.   DIAGNOSIS  In diagnosing flatfoot, the foot and ankle surgeon examines the foot and observes how it looks when you stand and sit. X-rays are usually taken to determine the severity of the disorder. If you are diagnosed with flexible flatfoot but you don t have any symptoms, your  surgeon will explain what you might expect in the future.  NON-SURGICAL TREATMENT  If you experience symptoms with flexible flatfoot, the surgeon may recommend non-surgical treatment options, including:  Activity modifications. Cut down on activities that bring you pain and avoid prolonged walking and standing to give your arches a rest.   Weight loss. If you are overweight, try to lose weight. Putting too much weight on your arches may aggravate your symptoms.   Orthotic devices. Your foot and ankle surgeon can provide you with custom orthotic devices for your shoes to give more support to the arches.   Immobilization. In some cases, it may be necessary to use a walking cast or to completely avoid weight-bearing.   Medications. Nonsteroidal anti-inflammatory drugs (NSAIDs), such as ibuprofen, help reduce pain and inflammation.   Physical therapy. Ultrasound therapy or other physical therapy modalities may be used to provide temporary relief.   Shoe modifications. Wearing shoes that support the arches is important for anyone who has flatfoot.   SURGICAL TREATMENT  In some patients whose pain is not adequately relieved by other treatments, surgery may be considered. A variety of surgical techniques is available to correct flexible flatfoot, and one or a combination of procedures may be required to relieve the symptoms and improve foot function.  In selecting the procedure or combination of procedures for your particular case, the foot and ankle surgeon will take into consideration the extent of your deformity based on the x-ray findings, your age, your activity level, and other factors. The length of the recovery period will vary, depending on the procedure or procedures performed.            Body Mass Index (BMI)  Many things can cause foot and ankle problems. Foot structure, activity level, foot mechanics and injuries are common causes of pain.  One very important issue that often goes unmentioned is body weight.  Extra weight can cause increased stress on muscles, ligaments, bones and tendons. Sometimes just a few extra pounds is all it takes to put one over her/his threshold. Without reducing that stress, it can be difficult to alleviate pain.   Some people are uncomfortable addressing this issue, but we feel it is important for you to think about it. As Foot & Ankle specialists, our job is addressing the lower extremity problem and possible causes.   Regarding extra body weight, we encourage patients to discuss diet and weight management plans with their primary care doctors. It is this team approach that gives you the best opportunity for pain relief and getting you back on your feet.     SMOKING CESSATION  What's in cigarette smoke? - Cigarette smoke contains over 4,000 chemicals. Nicotine is one of the main ingredients which is an insecticide/herbicide. It is poisonous to our nervous system, increases blood clotting risk, and decreases the body's defenses to fight off infection. Another chemical is Carbon Monoxide is an asphyxiating gas that permanently binds to blood cells and blocks the transport of oxygen. This leads to tissue death and decreases your metabolism. Tar is a chemical that coats your lungs and trachea which impairs new oxygen coming in and carbon dioxide getting out of your body.   How does smoking impact surgery? - Smoking is particularly hazardous with regards to surgery. Surgery puts stress on the body and a smoker's body is already under strain from these chemicals. Putting the two together, especially for an elective surgery, could be a recipe for disaster. Smoking before and after surgery increases your risk of heart problems, slow wound healing, delayed bone healing, blood clots, wound infection and anesthesia complications.   What are the benefits of quitting? - In 20 minutes your blood pressure will drop back down to normal. In 8 hours the carbon monoxide (a toxic gas) levels in your blood  stream will drop by half, and oxygen levels will return to normal. In 48 hours your chance of having a heart attack will have decreased. All nicotine will have left your body. Your sense of taste and smell will return to a normal level. In 72 hours your bronchial tubes will relax, and your energy levels will increase. In 2 weeks your circulation will increase, and it will continue to improve for the next 10 weeks.    Recommendations for elective surgery - Ideally, patients should quit smoking 8 weeks before and at least 2 weeks after elective surgery in order to avoid complications. Simply cutting back on the amount of cigarettes smoked per day does not offer any benefit or decrease the risk of poor wound healing, heart problems, and infection. Smokers should also start taking Vitamin C and B for two weeks before surgery and two weeks after surgery.    Ways to Stop Smokin. Nicotine patches, lozenges, or gum  2. Support Groups  3. Medications (see below)    List of Medications:  1. Varenicline Tartrate (CHANTIX)   2. Bupropion HCL (WELLBUTRIN, ZYBAN) - note: make sure Wellbutrin is for smoking cessation and not other issues   3. Nicotine Patch (NICODERM)   4. Nicotine Inhaler (NICOTROL)   5. Nicotine Gum Nicotine Polacrilex   6. Nicotine Lozenge: Nicotine Polacrilex (COMMIT)   * [facility] offers a smoking support group as well!  Please visit: [email or web address]  If you are interested in these, ask about getting a prescription or talk to your primary care doctor about what may be the best way for you to quit.                 Follow-ups after your visit        Future tests that were ordered for you today     Open Future Orders        Priority Expected Expires Ordered    MR Foot Right w/o & w Contrast Routine  2019            Who to contact     If you have questions or need follow up information about today's clinic visit or your schedule please contact [facility]  [facility] directly at [phone].  Normal or non-critical lab and imaging results will be communicated to you by MyChart, letter or phone within 4 business days after the clinic has received the results. If you do not hear from us within 7 days, please contact the clinic through [employer] or phone. If you have a critical or abnormal lab result, we will notify you by phone as soon as possible.  Submit refill requests through [email or web address] or call your pharmacy and they will forward the refill request to us. Please allow 3 business days for your refill to be completed.          Additional Information About Your Visit        [employer] Information     [email or web address] gives you secure access to your electronic health record. If you see a primary care provider, you can also send messages to your care team and make appointments. If you have questions, please call your primary care clinic.  If you do not have a primary care provider, please call [phone] and they will assist you.        Care EveryWhere ID     This is your Care EveryWhere ID. This could be used by other organizations to access your [facility] medical records  [id number]        Your Vitals Were     Pulse Temperature Height BMI (Body Mass Index)          96 98.8  F (37.1  C) (Oral) 5' 3\" (1.6 m) 32.77 kg/m2         Blood Pressure from Last 3 Encounters:   04/11/18 124/88   12/08/17 123/83   05/26/17 128/88    Weight from Last 3 Encounters:   04/11/18 185 lb (83.9 kg)   01/05/18 187 lb (84.8 kg)   12/15/17 187 lb (84.8 kg)                 Today's Medication Changes          These changes are accurate as of 4/11/18 11:53 AM.  If you have any questions, ask your nurse or doctor.               Start taking these medicines.        Dose/Directions    meloxicam 7.5 MG tablet   Commonly known as:  MOBIC   Used for:  Right foot pain, Pes planus of right foot   Started by:  Juancarlos Booth DPM        Dose:  7.5 mg   Take 1 tablet (7.5 mg) by mouth daily as needed   Quantity:  " 30 tablet   Refills:  1            Where to get your medicines      These medications were sent to [facility]/pharmacy #[id number] - [place], [place] - [address] [facility]  [address] [place] [address]     Phone:  [phone]     meloxicam 7.5 MG tablet                Primary Care Provider Office Phone # Fax #    Olya Connors PA-C [phone]       [address] [facility] [address]        Equal Access to Services     MELISSA JORDAN : Hadii aad ku hadasho Soomaali, waaxda luqadaha, qaybta kaalmada adeegyada, waxay idiin hayaan adeeg kharash lavanesa reed. So [facility] [phone].    ATENCIÓN: Si habla español, tiene a ovalle disposición servicios gratuitos de asistencia lingüística. [facility] [phone].    We comply with applicable federal civil rights laws and [place] laws. We do not discriminate on the basis of race, color, national origin, age, disability, sex, sexual orientation, or gender identity.            Thank you!     Thank you for choosing [facility]  for your care. Our goal is always to provide you with excellent care. Hearing back from our patients is one way we can continue to improve our services. Please take a few minutes to complete the written survey that you may receive in the mail after your visit with us. Thank you!             Your Updated Medication List - Protect others around you: Learn how to safely use, store and throw away your medicines at www.disposemymeds.org.          This list is accurate as of 4/11/18 11:53 AM.  Always use your most recent med list.                   Brand Name Dispense Instructions for use Diagnosis    ALPRAZolam 0.5 MG tablet    XANAX    30 tablet    Take 1 tablet (0.5 mg) by mouth 3 times daily as needed for anxiety    Anxiety       aluminum chloride 20 % external solution    DRYSOL    60 mL    Apply  topically At Bedtime. To improve effect, cover area of application with plastic wrap,  hold in place with tight shirt,  and wash area in morning. As sweating improves, decrease use to 1-2 times weekly.    Sweating       escitalopram 20 MG tablet    LEXAPRO    30 tablet    Take 1/2 tablet (10 mg) for 1-2 weeks, then increase to 1 tablet orally daily    Anxiety       ibuprofen 200 MG tablet    ADVIL/MOTRIN     Take 200 mg by mouth as needed. 2-3 TABLETS AT ONE TIME        meloxicam 7.5 MG tablet    MOBIC    30 tablet    Take 1 tablet (7.5 mg) by mouth daily as needed    Right foot pain, Pes planus of right foot       Multi-vitamin Tabs tablet     100 tablet    Take 1 tablet by mouth daily    Anxiety       nicotine 10 MG Inhaler    NICOTROL    180 each    Inhale 6-16 Cartridges into the lungs daily as needed for smoking cessation    Tobacco abuse disorder       norgestim-eth estrad triphasic 0.18/0.215/0.25 MG-35 MCG per tablet    TRINESSA (28)    90 tablet    Take 1 tablet by mouth daily May take continuously, having a period every 3 months.    Encounter for surveillance of contraceptive pills       oxyCODONE-acetaminophen 5-325 MG per tablet    PERCOCET    18 tablet    Take 1 tablet by mouth every 4 hours as needed for pain maximum 4 tablet(s) per day    Right foot pain       podofilox 0.5 % external solution    CONDYLOX    59.5 mL    APPLY TOPICALLY 2 TIMES DAILY FOR 3 DAYS THEN STOP FOR 4 DAYS. IF NEEDED REPEAT 4 TIMES    Genital warts       TYLENOL 500 MG tablet   Generic drug:  acetaminophen      Take 1-2 tablets by mouth as needed. 1 TABLET AS NEEDED

## 2018-04-11 NOTE — LETTER
"    4/11/2018         RE: Bita Alaniz  [address] [facility] [address]        Dear Colleague,    Thank you for referring your patient, Bita Alaniz, to the [facility]. Please see a copy of my visit note below.    Weight management plan: Patient was referred to their PCP to discuss a diet and exercise plan.      PATIENT HISTORY:  Bita Alaniz is a 27 year old female who presents to clinic for right foot pain, swelling.  9 months of intermittent pain with activity. Previously saw Dr. Cash who suspected flatfoot related pain, sinus tarsitis.  Pain is over the lateral rearfoot area.  7-10/10 pain.  She obtained custom orthotics, which helped initially, but pain has increased again.  No injury.  She is requesting Percocet today.  NSAIDs do not help per pt.  Reports stiffness, limping, swelling.  Family hx of MS.  Works as a .       EXAM:Vitals: /88 (Cuff Size: Adult Regular)  Pulse 96  Temp 98.8  F (37.1  C) (Oral)  Ht 5' 3\" (1.6 m)  Wt 185 lb (83.9 kg)  BMI 32.77 kg/m2  BMI= Body mass index is 32.77 kg/(m^2).    General appearance: Patient is alert and fully cooperative with history & exam.  No sign of distress is noted during the visit.     Dermatologic: Skin is intact to R LE without significant lesions, rash or abrasion.  No paronychia or evidence of soft tissue infection is noted.     Vascular: DP & PT pulses are intact & regular bilaterally.  Mild lateral R rearfoot edema.  CFT and skin temperature are normal to both lower extremities.     Neurologic: Lower extremity sensation is intact to light touch.  No evidence of weakness or contracture in the lower extremities.  No evidence of neuropathy.     Musculoskeletal: Pes planus b/l with decreased arch height, abducted forefoot, valgus heel.  Pain is over the R lateral STJ area into the sinus tarsi.  This seems to be a flexible deformity, but ankle equinus precludes complete reduction.  " Patient is ambulatory without assistive device or brace.  No gross ankle deformity noted.  No foot or ankle joint effusion is noted.     ASSESSMENT: R foot pain, flatfoot, question sinus tarsitis vs coalition     PLAN:  Reviewed patient's chart in epic.  Discussed condition and treatment options including pros and cons.    PRICE advised.  Wear orthotics.  No barefoot walking.    Advised Aircast boot with orthotic; pt declined the boot, stating it was making the pain worse after trying it on.  I explained this will not help instantly, but will likely help the more she wears it.  Crutches/rollabout offered.  Pt deferred.  She declined the boot as well stating she will check on insurance coverage.  I suggested some online resources for a CAM boot.    Will obtain MRI to eval for sinus tarsi pathology, possible coalition.  Stress reaction/fx possible.    I declined her request for Percocet.  Mobic prescribed prn.  Discussed mild risk of upper GI bleeding when combining NSAIDs with Lexapro and reviewed s/s for which to see immediate care.    F/u after MRI.  Discussed possible injection.       Juancarlos Booth DPM, FACFAS    Weight management plan: Patient was referred to their PCP to discuss a diet and exercise plan.        Again, thank you for allowing me to participate in the care of your patient.        Sincerely,        Juancarlos Booth DPM

## 2018-04-12 NOTE — PROGRESS NOTES
"PATIENT HISTORY:  Bita Alaniz is a 27 year old female who presents to clinic for right foot pain, swelling.  9 months of intermittent pain with activity. Previously saw Dr. Cash who suspected flatfoot related pain, sinus tarsitis.  Pain is over the lateral rearfoot area.  7-10/10 pain.  She obtained custom orthotics, which helped initially, but pain has increased again.  No injury.  She is requesting Percocet today.  NSAIDs do not help per pt.  Reports stiffness, limping, swelling.  Family hx of MS.  Works as a .       EXAM:Vitals: /88 (Cuff Size: Adult Regular)  Pulse 96  Temp 98.8  F (37.1  C) (Oral)  Ht 5' 3\" (1.6 m)  Wt 185 lb (83.9 kg)  BMI 32.77 kg/m2  BMI= Body mass index is 32.77 kg/(m^2).    General appearance: Patient is alert and fully cooperative with history & exam.  No sign of distress is noted during the visit.     Dermatologic: Skin is intact to R LE without significant lesions, rash or abrasion.  No paronychia or evidence of soft tissue infection is noted.     Vascular: DP & PT pulses are intact & regular bilaterally.  Mild lateral R rearfoot edema.  CFT and skin temperature are normal to both lower extremities.     Neurologic: Lower extremity sensation is intact to light touch.  No evidence of weakness or contracture in the lower extremities.  No evidence of neuropathy.     Musculoskeletal: Pes planus b/l with decreased arch height, abducted forefoot, valgus heel.  Pain is over the R lateral STJ area into the sinus tarsi.  This seems to be a flexible deformity, but ankle equinus precludes complete reduction.  Patient is ambulatory without assistive device or brace.  No gross ankle deformity noted.  No foot or ankle joint effusion is noted.     ASSESSMENT: R foot pain, flatfoot, question sinus tarsitis vs coalition     PLAN:  Reviewed patient's chart in epic.  Discussed condition and treatment options including pros and cons.    PRICE advised.  Wear orthotics.  " No barefoot walking.    Advised Aircast boot with orthotic; pt declined the boot, stating it was making the pain worse after trying it on.  I explained this will not help instantly, but will likely help the more she wears it.  Crutches/rollabout offered.  Pt deferred.  She declined the boot as well stating she will check on insurance coverage.  I suggested some online resources for a CAM boot.    Will obtain MRI to eval for sinus tarsi pathology, possible coalition.  Stress reaction/fx possible.    I declined her request for Percocet.  Mobic prescribed prn.  Discussed mild risk of upper GI bleeding when combining NSAIDs with Lexapro and reviewed s/s for which to see immediate care.    F/u after MRI.  Discussed possible injection.       Juancarlos Booth DPM, FACFAS    Weight management plan: Patient was referred to their PCP to discuss a diet and exercise plan.

## 2018-04-17 ENCOUNTER — RADIANT APPOINTMENT (OUTPATIENT)
Dept: MRI IMAGING | Facility: CLINIC | Age: 28
End: 2018-04-17
Attending: PODIATRIST
Payer: COMMERCIAL

## 2018-04-17 DIAGNOSIS — M21.41 PES PLANUS OF RIGHT FOOT: ICD-10-CM

## 2018-04-17 DIAGNOSIS — M79.671 RIGHT FOOT PAIN: ICD-10-CM

## 2018-04-17 PROCEDURE — 73718 MRI LOWER EXTREMITY W/O DYE: CPT | Mod: TC

## 2018-05-14 ENCOUNTER — TELEPHONE (OUTPATIENT)
Dept: PODIATRY | Facility: CLINIC | Age: 28
End: 2018-05-14

## 2018-05-14 ENCOUNTER — MYC MEDICAL ADVICE (OUTPATIENT)
Dept: FAMILY MEDICINE | Facility: CLINIC | Age: 28
End: 2018-05-14

## 2018-05-14 DIAGNOSIS — F41.9 ANXIETY: ICD-10-CM

## 2018-05-14 NOTE — TELEPHONE ENCOUNTER
Reason for Call:  Other request    Detailed comments: Patient stated discussed injection with pcp, would like to get injections for foot. Patient would like pcp to refer where to go for injections. Please advise.     Phone Number Patient can be reached at: Home number on file [phone] (home)    Best Time: Anytime    Can we leave a detailed message on this number? YES    Call taken on 5/14/2018 at 4:30 PM by Andreina Sears

## 2018-05-14 NOTE — TELEPHONE ENCOUNTER
Requested Prescriptions   Pending Prescriptions Disp Refills     escitalopram (LEXAPRO) 20 MG tablet 30 tablet 0     Sig: Take 1/2 tablet (10 mg) for 1-2 weeks, then increase to 1 tablet orally daily    There is no refill protocol information for this order        Last refill 3/21/18 # 30  Last OV 12/8/17.    Routed to PCP to advise as stated to follow up in 5  Months.  Plan also stated may need to restart daily medication.    Carlota Lomeli RN [facility] Triage.

## 2018-05-15 RX ORDER — ESCITALOPRAM OXALATE 20 MG/1
TABLET ORAL
Qty: 30 TABLET | Refills: 0 | Status: SHIPPED | OUTPATIENT
Start: 2018-05-15 | End: 2018-05-21

## 2018-05-15 NOTE — TELEPHONE ENCOUNTER
Patient calling back, scheduled for tomorrow at 10:30. Please mychart message patient to let her know how long this injection takes to kick in, side effects, etc.    Thanks!!  Ramesh Becker  Patient Representative

## 2018-05-16 ENCOUNTER — OFFICE VISIT (OUTPATIENT)
Dept: PODIATRY | Facility: CLINIC | Age: 28
End: 2018-05-16
Payer: COMMERCIAL

## 2018-05-16 VITALS
HEART RATE: 90 BPM | WEIGHT: 185 LBS | SYSTOLIC BLOOD PRESSURE: 130 MMHG | HEIGHT: 63 IN | DIASTOLIC BLOOD PRESSURE: 78 MMHG | BODY MASS INDEX: 32.78 KG/M2

## 2018-05-16 DIAGNOSIS — M25.571 SINUS TARSI SYNDROME OF RIGHT ANKLE: Primary | ICD-10-CM

## 2018-05-16 PROCEDURE — 20600 DRAIN/INJ JOINT/BURSA W/O US: CPT | Mod: RT | Performed by: PODIATRIST

## 2018-05-16 ASSESSMENT — PAIN SCALES - GENERAL: PAINLEVEL: SEVERE PAIN (7)

## 2018-05-16 NOTE — LETTER
"    5/16/2018         RE: Bita Alaniz  [address] [facility] [place] [address]        Dear Colleague,    Thank you for referring your patient, Bita Alaniz, to the [facility]. Please see a copy of my visit note below.    PATIENT HISTORY:  Bita Alaniz is a 27 year old female who presents to clinic for recheck of R foot pain, sinus tarsi syndrome.  Pt with recent MRI that confirmed this.  She did not get a boot.  Orthotics help.  Mobic was also helpful.  Presents in flip flops today.    Pt requesting injection.     EXAM:Vitals: /78  Pulse 90  Ht 5' 3\" (1.6 m)  Wt 185 lb (83.9 kg)  BMI 32.77 kg/m2  BMI= Body mass index is 32.77 kg/(m^2).    General appearance: Patient is alert and fully cooperative with history & exam.  No sign of distress is noted during the visit.     Dermatologic: Skin is intact to R LE without significant lesions, rash or abrasion.  No paronychia or evidence of soft tissue infection is noted.      Vascular: DP & PT pulses are intact & regular  on the R.  Mild lateral R rearfoot edema.  CFT and skin temperature are normal.      Neurologic: Lower extremity sensation is intact to light touch.  No evidence of weakness or contracture in the lower extremities.  No evidence of neuropathy.      Musculoskeletal: Pes planus b/l with decreased arch height, abducted forefoot, valgus heel.  Pain is over the R lateral STJ area into the sinus tarsi.  Patient is ambulatory without assistive device or brace.  No gross ankle deformity noted.  No foot or ankle joint effusion is noted.    MRI reviewed with pt.    Sinus tarsitis noted.  No coalition.     ASSESSMENT: R sinus tarsi syndrome     PLAN:  Reviewed patient's chart in epic.  Discussed condition and treatment options including pros and cons.    Pt will proceed with injection.  Reinforced need for orthotics, supportive shoes, avoid barefoot walking.    Procedure: Risks vs benefits discussed, including " risk of infection, continued pain, flare.  After consent, the right sinus tarsi area was prepped with betadine, then alcohol.  A mixture of 0.5cc of 1% lidocaine plain and 1.0 cc of Kenalog 10 was injected into the R sinus tarsi.  Pt had no pain with palpation in the area or prior pain after the injection.  Band-aid applied.  Patient tolerated procedure well.    Handouts given.  F/u prn.    Juancarlos Booth DPM, FACFAS    Weight management plan: Patient was referred to their PCP to discuss a diet and exercise plan.        Again, thank you for allowing me to participate in the care of your patient.        Sincerely,        Juancarlos Booth DPM

## 2018-05-16 NOTE — MR AVS SNAPSHOT
After Visit Summary   5/16/2018    Bita Alaniz    MRN: [id number]           Patient Information     Date Of Birth          1990        Visit Information        Provider Department      5/16/2018 10:30 AM Juancarlos Booth DPM [facility]        Today's Diagnoses     Sinus tarsi syndrome of right ankle    -  1      Care Instructions      Dr Booth [facility] Locations        Mondays Tuesdays   [facility] [facility] [place]   [address] [facility] [address] [place] [address] [place] [address]   [place] [address] [place] [address]   ph: [phone] ph: [phone]   fax: [phone] fax: [phone]       Wednesdays Thursdays - Surgery   [facility] [place] Scheduling - [place]: [phone]   [address] [place] [address] To Schedule an appointment please call:   ph: [phone]   fax: [phone]      Follow up as needed    What is the Sinus Tarsi Syndrome?  Definition:  Clinical disorder characterized by specific symptoms and signs localized to the sinus tarsi (known as the  eye of the foot ), which refers to an opening on the outside of the foot between the ankle and heel bone.   History:  First described by Augustin Faulkner in 1957. He also described a surgical procedure to address this problem (called the DONG Faulkner procedure) that involves removal of all or a portion of the contents of the sinus tarsi.  Etiology:  Cause can be due to an inversion (rolling out) ankle sprain (70-80% of the time) or can be due to a  pinching  or impingement of the soft tissues in the sinus tarsi due to a very pronated (rolling in) foot (20-30% of the time).  Clinical Presentation:  Patients present with localize pain to the sinus tarsi region with a feeling of instability and aggravation by weight bearing activity. These patients do poorly on uneven surfaces, i.e., grass and gravel. Physical examination reveals pain to palpation  of the sinus tarsi with aggravation on foot inversion (turning in) or eversion (turning out). Looseness and instability of the ankle and foot joints may be present as well.  Diagnostic Testing:   May include x-rays, bone scan, CT scan and MRI evaluation. Injection with local anesthetic is diagnostic for localizing this problem to the sinus tarsi. Many times this is a diagnosis make by excluding other common problems in the foot as definitive diagnostic findings are rarely present. MRI is probably the one best test to show changes in the tissues of the sinus tarsi involving either inflammation or scar tissue from previous injury. Arthroscopy may also be beneficial to directly evaluate the sinus for damaged tissue.  Treatment:  After a diagnosis is established conservative treatment can be initiated which is generally very effective in eliminating the problem. Treatment may include anti-inflammatories, stable shoes, period of immobilization, ankle sleeve and over-the-counter orthoses. Resistant cases may require a course of oral steroids, series of steroid injections, physical therapy or custom orthoses. Rarely is surgery indicated and if needed open surgery (through an incision) or closed surgery (via arthroscopy) can be considered. Excellent results should be expected but surgery is not a panacea and should be considered as a last resort.  Summary:  STS is a problem that can occur commonly after an ankle sprain or in someone who has a severely pronated foot. Diagnosis is critical as this will dictate appropriate treatment which can differ significantly from other common problems seen in the foot and ankle. Conservative treatment is usually effective and surgery is rarely needed and should be considered after an adequate and thorough trial of conservative treatment.          What is Expected After Steroid Injection   The injection that you received today included a steroid. This is a strong steroid that decreases  inflammation, reduces pain, and promotes healing. This type of steroid is different than anabolic steroids abused by body builders and professional athletes.   The injection also included a small dose of local anesthetic. This will result in local numbness for at least a few hours. The initial reduction in pain may simply be the effect of the anesthetic. The steroid will start to work as the local anesthetic is wearing off. It is hard to predict the degree of pain relief or the duration of benefit from a steroid injection. The injection may need to be repeated depending on your body's response and ongoing pain and problems.   Some people experience a few days of increased pain after a steroid injection. This reaction is partly due to bleeding or bruising immediately after the injection. The localized pain may last for a few days and can be treated with extra strength tylenol, local ice, and decreased activity. The pain should resolve as the steroid starts to take effect which can take up to two weeks. Please do not hesitate to call if you continue having problems beyond what is described above.           Body Mass Index (BMI)  Many things can cause foot and ankle problems. Foot structure, activity level, foot mechanics and injuries are common causes of pain.  One very important issue that often goes unmentioned is body weight. Extra weight can cause increased stress on muscles, ligaments, bones and tendons. Sometimes just a few extra pounds is all it takes to put one over her/his threshold. Without reducing that stress, it can be difficult to alleviate pain.   Some people are uncomfortable addressing this issue, but we feel it is important for you to think about it. As Foot & Ankle specialists, our job is addressing the lower extremity problem and possible causes.   Regarding extra body weight, we encourage patients to discuss diet and weight management plans with their primary care doctors. It is this team approach  that gives you the best opportunity for pain relief and getting you back on your feet.             Follow-ups after your visit        Follow-up notes from your care team     Return if symptoms worsen or fail to improve.      Your next 10 appointments already scheduled     May 18, 2018 11:20 AM CDT   Office Visit with Olya Connors PA-C   [facility] ([facility]    [address] [facility] [id number] [phone]           Bring a current list of meds and any records pertaining to this visit. For Physicals, please bring immunization records and any forms needing to be filled out. Please arrive 10 minutes early to complete paperwork.              Who to contact     If you have questions or need follow up information about today's clinic visit or your schedule please contact [facility] directly at [phone].  Normal or non-critical lab and imaging results will be communicated to you by MyChart, letter or phone within 4 business days after the clinic has received the results. If you do not hear from us within 7 days, please contact the clinic through [employer] or phone. If you have a critical or abnormal lab result, we will notify you by phone as soon as possible.  Submit refill requests through [employer] or call your pharmacy and they will forward the refill request to us. Please allow 3 business days for your refill to be completed.          Additional Information About Your Visit        MyChart Information     [employer] gives you secure access to your electronic health record. If you see a primary care provider, you can also send messages to your care team and make appointments. If you have questions, please call your primary care clinic.  If you do not have a primary care provider, please call [phone] and they will assist you.        Care EveryWhere ID     This is your Care EveryWhere ID. This could be used by  "other organizations to access your [facility] medical records  [id number]        Your Vitals Were     Pulse Height BMI (Body Mass Index)             90 5' 3\" (1.6 m) 32.77 kg/m2          Blood Pressure from Last 3 Encounters:   05/16/18 130/78   04/11/18 124/88   12/08/17 123/83    Weight from Last 3 Encounters:   05/16/18 185 lb (83.9 kg)   04/11/18 185 lb (83.9 kg)   01/05/18 187 lb (84.8 kg)              Today, you had the following     No orders found for display       Primary Care Provider Office Phone # Fax #    Olya Connors PA-C [phone]       [address] [facility] [address]        Equal Access to Services     MELISSA JORDAN : Hadii aad ku hadasho Sojaneen, waaxda luqadaha, qaybta kaalmada adeegyada, thiago lazo . So [facility] [phone].    ATENCIÓN: Si habla español, tiene a ovalle disposición servicios gratuitos de asistencia lingüística. Llame al [phone].    We comply with applicable federal civil rights laws and [place] laws. We do not discriminate on the basis of race, color, national origin, age, disability, sex, sexual orientation, or gender identity.            Thank you!     Thank you for choosing [facility]  for your care. Our goal is always to provide you with excellent care. Hearing back from our patients is one way we can continue to improve our services. Please take a few minutes to complete the written survey that you may receive in the mail after your visit with us. Thank you!             Your Updated Medication List - Protect others around you: Learn how to safely use, store and throw away your medicines at www.disposemymeds.org.          This list is accurate as of 5/16/18 11:03 AM.  Always use your most recent med list.                   Brand Name Dispense Instructions for use Diagnosis    ALPRAZolam 0.5 MG tablet    XANAX    30 tablet    Take 1 tablet (0.5 mg) by mouth 3 times daily as needed for " anxiety    Anxiety       aluminum chloride 20 % external solution    DRYSOL    60 mL    Apply  topically At Bedtime. To improve effect, cover area of application with plastic wrap,  hold in place with tight shirt, and wash area in morning. As sweating improves, decrease use to 1-2 times weekly.    Sweating       escitalopram 20 MG tablet    LEXAPRO    30 tablet    Take 1/2 tablet (10 mg) for 1-2 weeks, then increase to 1 tablet orally daily    Anxiety       ibuprofen 200 MG tablet    ADVIL/MOTRIN     Take 200 mg by mouth as needed. 2-3 TABLETS AT ONE TIME        meloxicam 7.5 MG tablet    MOBIC    30 tablet    Take 1 tablet (7.5 mg) by mouth daily as needed    Right foot pain, Pes planus of right foot       Multi-vitamin Tabs tablet     100 tablet    Take 1 tablet by mouth daily    Anxiety       nicotine 10 MG Inhaler    NICOTROL    180 each    Inhale 6-16 Cartridges into the lungs daily as needed for smoking cessation    Tobacco abuse disorder       norgestim-eth estrad triphasic 0.18/0.215/0.25 MG-35 MCG per tablet    TRINESSA (28)    90 tablet    Take 1 tablet by mouth daily May take continuously, having a period every 3 months.    Encounter for surveillance of contraceptive pills       podofilox 0.5 % external solution    CONDYLOX    59.5 mL    APPLY TOPICALLY 2 TIMES DAILY FOR 3 DAYS THEN STOP FOR 4 DAYS. IF NEEDED REPEAT 4 TIMES    Genital warts       TYLENOL 500 MG tablet   Generic drug:  acetaminophen      Take 1-2 tablets by mouth as needed. 1 TABLET AS NEEDED

## 2018-05-16 NOTE — PROGRESS NOTES
"PATIENT HISTORY:  Bita Alaniz is a 27 year old female who presents to clinic for recheck of R foot pain, sinus tarsi syndrome.  Pt with recent MRI that confirmed this.  She did not get a boot.  Orthotics help.  Mobic was also helpful.  Presents in flip flops today.    Pt requesting injection.     EXAM:Vitals: /78  Pulse 90  Ht 5' 3\" (1.6 m)  Wt 185 lb (83.9 kg)  BMI 32.77 kg/m2  BMI= Body mass index is 32.77 kg/(m^2).    General appearance: Patient is alert and fully cooperative with history & exam.  No sign of distress is noted during the visit.     Dermatologic: Skin is intact to R LE without significant lesions, rash or abrasion.  No paronychia or evidence of soft tissue infection is noted.      Vascular: DP & PT pulses are intact & regular on the R.  Mild lateral R rearfoot edema.  CFT and skin temperature are normal.      Neurologic: Lower extremity sensation is intact to light touch.  No evidence of weakness or contracture in the lower extremities.  No evidence of neuropathy.      Musculoskeletal: Pes planus b/l with decreased arch height, abducted forefoot, valgus heel.  Pain is over the R lateral STJ area into the sinus tarsi.  Patient is ambulatory without assistive device or brace.  No gross ankle deformity noted.  No foot or ankle joint effusion is noted.    MRI reviewed with pt.    Sinus tarsitis noted.  No coalition.     ASSESSMENT: R sinus tarsi syndrome     PLAN:  Reviewed patient's chart in epic.  Discussed condition and treatment options including pros and cons.    Pt will proceed with injection.  Reinforced need for orthotics, supportive shoes, avoid barefoot walking.    Procedure: Risks vs benefits discussed, including risk of infection, continued pain, flare.  After consent, the right sinus tarsi area was prepped with betadine, then alcohol.  A mixture of 0.5cc of 1% lidocaine plain and 1.0 cc of Kenalog 10 was injected into the R sinus tarsi.  Pt had no pain with palpation in " the area or prior pain after the injection.  Band-aid applied.  Patient tolerated procedure well.    Handouts given.  F/u prn.    Juancarlos Booth DPM, FACFAS    Weight management plan: Patient was referred to their PCP to discuss a diet and exercise plan.

## 2018-05-16 NOTE — PATIENT INSTRUCTIONS
[facility] Locations        Mondays Tuesdays   [facility] [place]   [address]   [place] [address] [place] [address]   ph: [phone] ph: [phone]   fax: [phone] fax: [phone]       Wednesdays Thursdays - Surgery   [facility] [place] [facility] Scheduling - [place]: [phone]   [address] [place] [address] To Schedule an appointment please call:   ph: [phone]   fax: [phone]      Follow up as needed    What is the Sinus Tarsi Syndrome?  Definition:  Clinical disorder characterized by specific symptoms and signs localized to the sinus tarsi (known as the  eye of the foot ), which refers to an opening on the outside of the foot between the ankle and heel bone.   History:  First described by Augustin Faulkner in 1957. He also described a surgical procedure to address this problem (called the DONG Faulkner procedure) that involves removal of all or a portion of the contents of the sinus tarsi.  Etiology:  Cause can be due to an inversion (rolling out) ankle sprain (70-80% of the time) or can be due to a  pinching  or impingement of the soft tissues in the sinus tarsi due to a very pronated (rolling in) foot (20-30% of the time).  Clinical Presentation:  Patients present with localize pain to the sinus tarsi region with a feeling of instability and aggravation by weight bearing activity. These patients do poorly on uneven surfaces, i.e., grass and gravel. Physical examination reveals pain to palpation of the sinus tarsi with aggravation on foot inversion (turning in) or eversion (turning out). Looseness and instability of the ankle and foot joints may be present as well.  Diagnostic Testing:   May include x-rays, bone scan, CT scan and MRI evaluation. Injection with local anesthetic is diagnostic for localizing this problem to the sinus tarsi. Many times this is a diagnosis make  by excluding other common problems in the foot as definitive diagnostic findings are rarely present. MRI is probably the one best test to show changes in the tissues of the sinus tarsi involving either inflammation or scar tissue from previous injury. Arthroscopy may also be beneficial to directly evaluate the sinus for damaged tissue.  Treatment:  After a diagnosis is established conservative treatment can be initiated which is generally very effective in eliminating the problem. Treatment may include anti-inflammatories, stable shoes, period of immobilization, ankle sleeve and over-the-counter orthoses. Resistant cases may require a course of oral steroids, series of steroid injections, physical therapy or custom orthoses. Rarely is surgery indicated and if needed open surgery (through an incision) or closed surgery (via arthroscopy) can be considered. Excellent results should be expected but surgery is not a panacea and should be considered as a last resort.  Summary:  STS is a problem that can occur commonly after an ankle sprain or in someone who has a severely pronated foot. Diagnosis is critical as this will dictate appropriate treatment which can differ significantly from other common problems seen in the foot and ankle. Conservative treatment is usually effective and surgery is rarely needed and should be considered after an adequate and thorough trial of conservative treatment.          What is Expected After Steroid Injection   The injection that you received today included a steroid. This is a strong steroid that decreases inflammation, reduces pain, and promotes healing. This type of steroid is different than anabolic steroids abused by body builders and professional athletes.   The injection also included a small dose of local anesthetic. This will result in local numbness for at least a few hours. The initial reduction in pain may simply be the effect of the anesthetic. The steroid will start to work  as the local anesthetic is wearing off. It is hard to predict the degree of pain relief or the duration of benefit from a steroid injection. The injection may need to be repeated depending on your body's response and ongoing pain and problems.   Some people experience a few days of increased pain after a steroid injection. This reaction is partly due to bleeding or bruising immediately after the injection. The localized pain may last for a few days and can be treated with extra strength tylenol, local ice, and decreased activity. The pain should resolve as the steroid starts to take effect which can take up to two weeks. Please do not hesitate to call if you continue having problems beyond what is described above.           Body Mass Index (BMI)  Many things can cause foot and ankle problems. Foot structure, activity level, foot mechanics and injuries are common causes of pain.  One very important issue that often goes unmentioned is body weight. Extra weight can cause increased stress on muscles, ligaments, bones and tendons. Sometimes just a few extra pounds is all it takes to put one over her/his threshold. Without reducing that stress, it can be difficult to alleviate pain.   Some people are uncomfortable addressing this issue, but we feel it is important for you to think about it. As Foot & Ankle specialists, our job is addressing the lower extremity problem and possible causes.   Regarding extra body weight, we encourage patients to discuss diet and weight management plans with their primary care doctors. It is this team approach that gives you the best opportunity for pain relief and getting you back on your feet.         SMOKING CESSATION  What's in cigarette smoke? - Cigarette smoke contains over 4,000 chemicals. Nicotine is one of the main ingredients which is an insecticide/herbicide. It is poisonous to our nervous system, increases blood clotting risk, and decreases the body's defenses to fight off  infection. Another chemical is Carbon Monoxide is an asphyxiating gas that permanently binds to blood cells and blocks the transport of oxygen. This leads to tissue death and decreases your metabolism. Tar is a chemical that coats your lungs and trachea which impairs new oxygen coming in and carbon dioxide getting out of your body.   How does smoking impact surgery? - Smoking is particularly hazardous with regards to surgery. Surgery puts stress on the body and a smoker's body is already under strain from these chemicals. Putting the two together, especially for an elective surgery, could be a recipe for disaster. Smoking before and after surgery increases your risk of heart problems, slow wound healing, delayed bone healing, blood clots, wound infection and anesthesia complications.   What are the benefits of quitting? - In 20 minutes your blood pressure will drop back down to normal. In 8 hours the carbon monoxide (a toxic gas) levels in your blood stream will drop by half, and oxygen levels will return to normal. In 48 hours your chance of having a heart attack will have decreased. All nicotine will have left your body. Your sense of taste and smell will return to a normal level. In 72 hours your bronchial tubes will relax, and your energy levels will increase. In 2 weeks your circulation will increase, and it will continue to improve for the next 10 weeks.    Recommendations for elective surgery - Ideally, patients should quit smoking 8 weeks before and at least 2 weeks after elective surgery in order to avoid complications. Simply cutting back on the amount of cigarettes smoked per day does not offer any benefit or decrease the risk of poor wound healing, heart problems, and infection. Smokers should also start taking Vitamin C and B for two weeks before surgery and two weeks after surgery.    Ways to Stop Smokin. Nicotine patches, lozenges, or gum  2. Support Groups  3. Medications (see below)    List  of Medications:  1. Varenicline Tartrate (CHANTIX)   2. Bupropion HCL (WELLBUTRIN, ZYBAN)   note: make sure Wellbutrin is for smoking cessation and not other issues   3. Nicotine Patch (NICODERM)   4. Nicotine Inhaler (NICOTROL)   5. Nicotine Gum Nicotine Polacrilex   6. Nicotine Lozenge: Nicotine Polacrilex (COMMIT)   * [facility] offers a smoking support group as well!  Please visit: [email or web address]  If you are interested in these, ask about getting a prescription or talk to your primary care doctor about what may be the best way for you to quit.

## 2018-05-21 ENCOUNTER — OFFICE VISIT (OUTPATIENT)
Dept: FAMILY MEDICINE | Facility: CLINIC | Age: 28
End: 2018-05-21
Payer: COMMERCIAL

## 2018-05-21 VITALS
HEART RATE: 88 BPM | WEIGHT: 185 LBS | SYSTOLIC BLOOD PRESSURE: 136 MMHG | DIASTOLIC BLOOD PRESSURE: 80 MMHG | BODY MASS INDEX: 32.77 KG/M2 | TEMPERATURE: 98.7 F

## 2018-05-21 DIAGNOSIS — F41.9 ANXIETY: ICD-10-CM

## 2018-05-21 DIAGNOSIS — Z30.41 ENCOUNTER FOR SURVEILLANCE OF CONTRACEPTIVE PILLS: ICD-10-CM

## 2018-05-21 PROCEDURE — 99213 OFFICE O/P EST LOW 20 MIN: CPT | Performed by: PHYSICIAN ASSISTANT

## 2018-05-21 RX ORDER — ALPRAZOLAM 0.5 MG
0.5 TABLET ORAL 3 TIMES DAILY PRN
Qty: 30 TABLET | Refills: 0 | Status: SHIPPED | OUTPATIENT
Start: 2018-05-21 | End: 2018-10-09

## 2018-05-21 RX ORDER — NORGESTIMATE AND ETHINYL ESTRADIOL 7DAYSX3 28
1 KIT ORAL DAILY
Qty: 90 TABLET | Refills: 0 | Status: SHIPPED | OUTPATIENT
Start: 2018-05-21 | End: 2018-08-30

## 2018-05-21 RX ORDER — ESCITALOPRAM OXALATE 20 MG/1
20 TABLET ORAL DAILY
Qty: 90 TABLET | Refills: 1 | Status: SHIPPED | OUTPATIENT
Start: 2018-05-21 | End: 2019-01-30

## 2018-05-21 ASSESSMENT — ANXIETY QUESTIONNAIRES
1. FEELING NERVOUS, ANXIOUS, OR ON EDGE: SEVERAL DAYS
5. BEING SO RESTLESS THAT IT IS HARD TO SIT STILL: NOT AT ALL
6. BECOMING EASILY ANNOYED OR IRRITABLE: NOT AT ALL
3. WORRYING TOO MUCH ABOUT DIFFERENT THINGS: SEVERAL DAYS
2. NOT BEING ABLE TO STOP OR CONTROL WORRYING: SEVERAL DAYS
7. FEELING AFRAID AS IF SOMETHING AWFUL MIGHT HAPPEN: SEVERAL DAYS
GAD7 TOTAL SCORE: 4
IF YOU CHECKED OFF ANY PROBLEMS ON THIS QUESTIONNAIRE, HOW DIFFICULT HAVE THESE PROBLEMS MADE IT FOR YOU TO DO YOUR WORK, TAKE CARE OF THINGS AT HOME, OR GET ALONG WITH OTHER PEOPLE: SOMEWHAT DIFFICULT

## 2018-05-21 ASSESSMENT — PATIENT HEALTH QUESTIONNAIRE - PHQ9: 5. POOR APPETITE OR OVEREATING: NOT AT ALL

## 2018-05-21 NOTE — PROGRESS NOTES
SUBJECTIVE:   Bita Alaniz is a 27 year old female who presents to clinic today for the following health issues:        Medication Followup of  Lexapro and xanax and refills     Taking Medication as prescribed: yes    Side Effects:  None    Medication Helping Symptoms:  yes   Still having lots of anxiety for the follow up for pap.  Aunt is sick too.  Still not doing good quitting smoking.    Foot is better.  Had steroid injection and that helped.  That helps mood.  Wants to start exercising now with pain gone.    Sometimes feels she needs a touch more.          Problem list and histories reviewed & adjusted, as indicated.  Additional history: as documented    Patient Active Problem List   Diagnosis     PMS (premenstrual syndrome)     Low back pain     Menorrhagia     CARDIOVASCULAR SCREENING; LDL GOAL LESS THAN 160     Sweating     Anxiety     Contraception management     Genital warts     NATE III (cervical intraepithelial neoplasia III)     Tobacco abuse disorder     Right foot pain     Past Surgical History:   Procedure Laterality Date     C ORAL SURGERY PROCEDURE      wisdom teeth removed     LEEP TX, CERVICAL  05/26/2017 5/26/17 LEEP: NATE 3, neg margins. ECC: neg       Social History   Substance Use Topics     Smoking status: Current Every Day Smoker     Packs/day: 0.75     Types: Cigarettes     Smokeless tobacco: Never Used      Comment: started at age 15, not much second hand     Alcohol use Yes      Comment: occasionally     Family History   Problem Relation Age of Onset     Musculoskeletal Disorder Mother      DIABETES Mother      Psychotic Disorder Mother      anxiety, ocd     Musculoskeletal Disorder Maternal Grandmother      CANCER Maternal Grandmother      lung     CANCER Paternal Grandmother      Unknown/Adopted Father            Reviewed and updated as needed this visit by clinical staff  Tobacco  Allergies  Meds  Med Hx  Surg Hx  Fam Hx  Soc Hx      Reviewed and updated as needed  this visit by Provider  Allergies         ROS:  As above    OBJECTIVE:     /80  Pulse 88  Temp 98.7  F (37.1  C) (Oral)  Wt 185 lb (83.9 kg)  BMI 32.77 kg/m2  Body mass index is 32.77 kg/(m^2).  GENERAL: healthy, alert and no distress  RESP: lungs clear to auscultation - no rales, rhonchi or wheezes  CV: regular rates and rhythm, normal S1 S2, no S3 or S4 and no murmur, click or rub  PSYCH: mentation appears normal, affect normal/bright    Diagnostic Test Results:  none     ASSESSMENT/PLAN:       1. Anxiety  Stable.  No changes.  Hopefully now that she can walk more this will help.    - ALPRAZolam (XANAX) 0.5 MG tablet; Take 1 tablet (0.5 mg) by mouth 3 times daily as needed for anxiety  Dispense: 30 tablet; Refill: 0  - escitalopram (LEXAPRO) 20 MG tablet; Take 1 tablet (20 mg) by mouth daily  Dispense: 90 tablet; Refill: 1    2. Encounter for surveillance of contraceptive pills  Refilled until she comes to pap  - norgestim-eth estrad triphasic (TRINESSA, 28,) 0.18/0.215/0.25 MG-35 MCG per tablet; Take 1 tablet by mouth daily May take continuously, having a period every 3 months.  Dispense: 90 tablet; Refill: 0    FUTURE APPOINTMENTS:       - Follow-up visit in 1 month for pap     Olya Connors PA-C  [facility]

## 2018-05-21 NOTE — MR AVS SNAPSHOT
After Visit Summary   5/21/2018    Bita Alaniz    MRN: [id number]           Patient Information     Date Of Birth          1990        Visit Information        Provider Department      5/21/2018 9:20 AM Olya Connors PA-C [facility]        Today's Diagnoses     Anxiety        Encounter for surveillance of contraceptive pills           Follow-ups after your visit        Follow-up notes from your care team     Return in about 4 weeks (around 6/18/2018) for Physical Exam.      Who to contact     If you have questions or need follow up information about today's clinic visit or your schedule please contact [facility] directly at [phone].  Normal or non-critical lab and imaging results will be communicated to you by MyChart, letter or phone within 4 business days after the clinic has received the results. If you do not hear from us within 7 days, please contact the clinic through MyChart or phone. If you have a critical or abnormal lab result, we will notify you by phone as soon as possible.  Submit refill requests through [facility] or call your pharmacy and they will forward the refill request to us. Please allow 3 business days for your refill to be completed.          Additional Information About Your Visit        MyChart Information     [facility] gives you secure access to your electronic health record. If you see a primary care provider, you can also send messages to your care team and make appointments. If you have questions, please call your primary care clinic.  If you do not have a primary care provider, please call [phone] and they will assist you.        Care EveryWhere ID     This is your Care EveryWhere ID. This could be used by other organizations to access your [facility] medical records  [id number]         Blood Pressure from Last 3 Encounters:   05/16/18 130/78   04/11/18 124/88   12/08/17 123/83    Weight from Last  3 Encounters:   05/16/18 185 lb (83.9 kg)   04/11/18 185 lb (83.9 kg)   01/05/18 187 lb (84.8 kg)              Today, you had the following     No orders found for display         Today's Medication Changes          These changes are accurate as of 5/21/18  9:59 AM.  If you have any questions, ask your nurse or doctor.               These medicines have changed or have updated prescriptions.        Dose/Directions    escitalopram 20 MG tablet   Commonly known as:  LEXAPRO   This may have changed:    - how much to take  - how to take this  - when to take this  - additional instructions   Used for:  Anxiety   Changed by:  Olya Connors PA-C        Dose:  20 mg   Take 1 tablet (20 mg) by mouth daily   Quantity:  90 tablet   Refills:  1            Where to get your medicines      These medications were sent to [facility]pharmacy [address] [place], [place] - [address] [address]     Phone:  [phone]     escitalopram 20 MG tablet    norgestim-eth estrad triphasic 0.18/0.215/0.25 MG-35 MCG per tablet         Some of these will need a paper prescription and others can be bought over the counter.  Ask your nurse if you have questions.     Bring a paper prescription for each of these medications     ALPRAZolam 0.5 MG tablet                Primary Care Provider Office Phone # Fax #    Olya Connors PA-C [phone]       [address] [facility] [address]        Equal Access to Services     MELISSA JORDAN AH: Hadii karrie guerrero hadasho Soomaali, waaxda luqadaha, qaybta kaalmada adeegyada, thiago reed. So [facility] [phone].    ATENCIÓN: Si habla marni, tiene a ovalle disposición servicios gratuitos de asistencia lingüística. Llame al [phone].    We comply with applicable federal civil rights laws and [place] laws. We do not discriminate on the basis of race, color, national origin, age, disability, sex, sexual orientation,  or gender identity.            Thank you!     Thank you for choosing [facility]  for your care. Our goal is always to provide you with excellent care. Hearing back from our patients is one way we can continue to improve our services. Please take a few minutes to complete the written survey that you may receive in the mail after your visit with us. Thank you!             Your Updated Medication List - Protect others around you: Learn how to safely use, store and throw away your medicines at www.disposemymeds.org.          This list is accurate as of 5/21/18  9:59 AM.  Always use your most recent med list.                   Brand Name Dispense Instructions for use Diagnosis    ALPRAZolam 0.5 MG tablet    XANAX    30 tablet    Take 1 tablet (0.5 mg) by mouth 3 times daily as needed for anxiety    Anxiety       escitalopram 20 MG tablet    LEXAPRO    90 tablet    Take 1 tablet (20 mg) by mouth daily    Anxiety       ibuprofen 200 MG tablet    ADVIL/MOTRIN     Take 200 mg by mouth as needed. 2-3 TABLETS AT ONE TIME        meloxicam 7.5 MG tablet    MOBIC    30 tablet    Take 1 tablet (7.5 mg) by mouth daily as needed    Right foot pain, Pes planus of right foot       Multi-vitamin Tabs tablet     100 tablet    Take 1 tablet by mouth daily    Anxiety       norgestim-eth estrad triphasic 0.18/0.215/0.25 MG-35 MCG per tablet    TRINESSA (28)    90 tablet    Take 1 tablet by mouth daily May take continuously, having a period every 3 months.    Encounter for surveillance of contraceptive pills       podofilox 0.5 % external solution    CONDYLOX    59.5 mL    APPLY TOPICALLY 2 TIMES DAILY FOR 3 DAYS THEN STOP FOR 4 DAYS. IF NEEDED REPEAT 4 TIMES    Genital warts       TYLENOL 500 MG tablet   Generic drug:  acetaminophen      Take 1-2 tablets by mouth as needed. 1 TABLET AS NEEDED

## 2018-05-22 ASSESSMENT — ANXIETY QUESTIONNAIRES: GAD7 TOTAL SCORE: 4

## 2018-06-06 DIAGNOSIS — M21.41 PES PLANUS OF RIGHT FOOT: ICD-10-CM

## 2018-06-06 DIAGNOSIS — M79.671 RIGHT FOOT PAIN: ICD-10-CM

## 2018-06-07 RX ORDER — MELOXICAM 7.5 MG/1
7.5 TABLET ORAL DAILY PRN
Qty: 30 TABLET | Refills: 0 | Status: SHIPPED | OUTPATIENT
Start: 2018-06-07 | End: 2018-10-09

## 2018-06-07 NOTE — TELEPHONE ENCOUNTER
Routed to Dr. Booth to advise.    Leidy Lozano CMA ([facility])  Podiatry/Foot & Ankle Surgery  [facility]

## 2018-07-02 ENCOUNTER — HEALTH MAINTENANCE LETTER (OUTPATIENT)
Age: 28
End: 2018-07-02

## 2018-07-30 ENCOUNTER — HEALTH MAINTENANCE LETTER (OUTPATIENT)
Age: 28
End: 2018-07-30

## 2018-08-30 ENCOUNTER — TELEPHONE (OUTPATIENT)
Dept: FAMILY MEDICINE | Facility: CLINIC | Age: 28
End: 2018-08-30

## 2018-08-30 DIAGNOSIS — Z30.41 ENCOUNTER FOR SURVEILLANCE OF CONTRACEPTIVE PILLS: ICD-10-CM

## 2018-08-30 RX ORDER — NORGESTIMATE AND ETHINYL ESTRADIOL 7DAYSX3 28
1 KIT ORAL DAILY
Qty: 30 TABLET | Refills: 0 | Status: SHIPPED | OUTPATIENT
Start: 2018-08-30 | End: 2018-10-09

## 2018-08-30 NOTE — TELEPHONE ENCOUNTER
Reason for Call:  Medication or medication refill:    Do you use a [facility]?  Name of the pharmacy and phone number for the current request:  [facility]    Name of the medication requested: norgestim-eth estrad triphasic (TRINESSA, 28,)      Other request: Patient said that her dog literally ate her birth control.  Patient requesting replacement medication    Can we leave a detailed message on this number? YES    Phone number patient can be reached at: Cell number on file:    Telephone Information:   Mobile [phone]       Best Time:     Call taken on 8/30/2018 at 4:38 PM by Ama Read

## 2018-08-30 NOTE — TELEPHONE ENCOUNTER
Last Rx was 90 tabs on 5/21/18 so appears is due for refill anyhow.    Last visit was also 5/21/18.  She has not followed up for pap and physical as advised (1 month follow up).    Medication is being filled for 1 time refill only due to:  Patient needs to be seen because due for physical and pap.     She is scheduled for 9/17/18.    Jennie Main RN  [facility]

## 2018-10-09 ENCOUNTER — OFFICE VISIT (OUTPATIENT)
Dept: FAMILY MEDICINE | Facility: CLINIC | Age: 28
End: 2018-10-09
Payer: COMMERCIAL

## 2018-10-09 VITALS
TEMPERATURE: 98.9 F | HEART RATE: 105 BPM | BODY MASS INDEX: 31.89 KG/M2 | SYSTOLIC BLOOD PRESSURE: 130 MMHG | DIASTOLIC BLOOD PRESSURE: 79 MMHG | WEIGHT: 180 LBS

## 2018-10-09 DIAGNOSIS — F41.9 ANXIETY: ICD-10-CM

## 2018-10-09 DIAGNOSIS — Z30.41 ENCOUNTER FOR SURVEILLANCE OF CONTRACEPTIVE PILLS: ICD-10-CM

## 2018-10-09 DIAGNOSIS — D06.9 CARCINOMA IN SITU OF CERVIX, UNSPECIFIED LOCATION: Primary | ICD-10-CM

## 2018-10-09 DIAGNOSIS — A63.0 GENITAL WARTS: ICD-10-CM

## 2018-10-09 PROCEDURE — G0476 HPV COMBO ASSAY CA SCREEN: HCPCS | Performed by: PHYSICIAN ASSISTANT

## 2018-10-09 PROCEDURE — 88141 CYTOPATH C/V INTERPRET: CPT | Performed by: PHYSICIAN ASSISTANT

## 2018-10-09 PROCEDURE — 88175 CYTOPATH C/V AUTO FLUID REDO: CPT | Performed by: PHYSICIAN ASSISTANT

## 2018-10-09 PROCEDURE — 99214 OFFICE O/P EST MOD 30 MIN: CPT | Performed by: PHYSICIAN ASSISTANT

## 2018-10-09 RX ORDER — NORGESTIMATE AND ETHINYL ESTRADIOL 7DAYSX3 28
1 KIT ORAL DAILY
Qty: 90 TABLET | Refills: 3 | Status: SHIPPED | OUTPATIENT
Start: 2018-10-09 | End: 2019-10-07

## 2018-10-09 RX ORDER — ALPRAZOLAM 0.5 MG
0.5 TABLET ORAL 3 TIMES DAILY PRN
Qty: 30 TABLET | Refills: 0 | Status: SHIPPED | OUTPATIENT
Start: 2018-10-09

## 2018-10-09 RX ORDER — PODOFILOX 5 MG/ML
SOLUTION TOPICAL
Qty: 59.5 ML | Refills: 3 | Status: SHIPPED | OUTPATIENT
Start: 2018-10-09

## 2018-10-09 NOTE — PROGRESS NOTES
SUBJECTIVE:   Bita Alaniz is a 28 year old female who presents to clinic today for the following health issues:        Pap smear today.LMP 10-9-2018   Anxiety has been up due to her aunt still struggling with cancer.  She knows she needs to quit smoking but she can't.  She is very disappointed with herself on this.          Problem list and histories reviewed & adjusted, as indicated.  Additional history: as documented    Patient Active Problem List   Diagnosis     PMS (premenstrual syndrome)     Low back pain     Menorrhagia     CARDIOVASCULAR SCREENING; LDL GOAL LESS THAN 160     Sweating     Anxiety     Contraception management     Genital warts     NATE III (cervical intraepithelial neoplasia III)     Tobacco abuse disorder     Right foot pain     Past Surgical History:   Procedure Laterality Date     C ORAL SURGERY PROCEDURE      wisdom teeth removed     LEEP TX, CERVICAL  05/26/2017 5/26/17 LEEP: NATE 3, neg margins. ECC: neg       Social History   Substance Use Topics     Smoking status: Current Every Day Smoker     Packs/day: 0.75     Types: Cigarettes     Smokeless tobacco: Never Used      Comment: started at age 15, not much second hand     Alcohol use Yes      Comment: occasionally     Family History   Problem Relation Age of Onset     Musculoskeletal Disorder Mother      Diabetes Mother      Psychotic Disorder Mother      anxiety, ocd     Musculoskeletal Disorder Maternal Grandmother      Cancer Maternal Grandmother      lung     Cancer Paternal Grandmother      Unknown/Adopted Father            Reviewed and updated as needed this visit by clinical staff  Tobacco  Allergies  Meds       Reviewed and updated as needed this visit by Provider         ROS:  As above    OBJECTIVE:     /79  Pulse 105  Temp 98.9  F (37.2  C) (Oral)  Wt 180 lb (81.6 kg)  LMP 10/09/2018  BMI 31.89 kg/m2  Body mass index is 31.89 kg/(m^2).  GENERAL: healthy, alert and no distress   (female): normal  female external genitalia, normal urethral meatus , vaginal mucosa pink, moist, well rugated, vaginal skin findings: wart on right upper labia and normal cervix, adnexae, and uterus without masses.        ASSESSMENT/PLAN:       1. Carcinoma in situ of cervix, unspecified location  Follow up as needed when results are back  - Pap imaged thin layer diagnostic with HPV (select HPV order below)  - HPV High Risk Types DNA Cervical    2. Anxiety  Refilled.  Needs for travel.    - ALPRAZolam (XANAX) 0.5 MG tablet; Take 1 tablet (0.5 mg) by mouth 3 times daily as needed for anxiety  Dispense: 30 tablet; Refill: 0    3. Genital warts  Continue to treat as above  - podofilox (CONDYLOX) 0.5 % external solution; APPLY TOPICALLY 2 TIMES DAILY FOR 3 DAYS THEN STOP FOR 4 DAYS. IF NEEDED REPEAT 4 TIMES  Dispense: 59.5 mL; Refill: 3    4. Encounter for surveillance of contraceptive pills    - norgestim-eth estrad triphasic (TRINESSA, 28,) 0.18/0.215/0.25 MG-35 MCG per tablet; Take 1 tablet by mouth daily May take continuously, having a period every 3 months.  Dispense: 90 tablet; Refill: 3    Consider wellbutrin for smoking cessation.  She does not want to try chantix.      Olya Connors PA-C  [facility]

## 2018-10-09 NOTE — PATIENT INSTRUCTIONS
Patient Education    Bupropion Hydrobromide Oral tablet, extended-release    Bupropion Hydrochloride Oral tablet [Depression/Mood Disorders]    Bupropion Hydrochloride Oral tablet, extended release 12 hour [Depression/Mood Disorders]    Bupropion Hydrochloride Oral tablet, extended release 12 hour [Smoking Cessation]    Bupropion Hydrochloride Oral tablet, extended release 24 hour [Depression/Mood Disorders]  Bupropion Hydrobromide Oral tablet, extended-release  What is this medicine?  BUPROPION (byoo PROE pee on) is used to treat depression.  This medicine may be used for other purposes; ask your health care provider or pharmacist if you have questions.  What should I tell my health care provider before I take this medicine?  They need to know if you have any of these conditions:    an eating disorder, such as anorexia or bulimia    bipolar disorder or psychosis    diabetes or high blood sugar, treated with medication    glaucoma    head injury or brain tumor    heart disease, previous heart attack, or irregular heart beat    high blood pressure    kidney or liver disease    seizures (convulsions)    suicidal thoughts or a previous suicide attempt    Tourette's syndrome    weight loss    an unusual or allergic reaction to bupropion, other medicines, foods, dyes, or preservatives    breast-feeding    pregnant or trying to become pregnant  How should I use this medicine?  Take this medicine by mouth with a glass of water. Follow the directions on the prescription label. You can take it with or without food. If it upsets your stomach, take it with food. Do not crush, chew, or cut these tablets. This medicine is taken once daily at the same time each day. Do not take your medicine more often than directed. Do not stop taking this medicine suddenly except upon the advice of your doctor. Stopping this medicine too quickly may cause serious side effects or your condition may worsen.  A special MedGuide will be given to  you by the pharmacist with each prescription and refill. Be sure to read this information carefully each time.  Talk to your pediatrician regarding the use of this medicine in children. Special care may be needed.  Overdosage: If you think you have taken too much of this medicine contact a poison control center or emergency room at once.  NOTE: This medicine is only for you. Do not share this medicine with others.  What if I miss a dose?  If you miss a dose, skip the missed dose and take your next tablet at the regular time. Do not take double or extra doses.  What may interact with this medicine?  Do not take this medicine with any of the following medications:    linezolid    MAOIs like Azilect, Carbex, Eldepryl, Marplan, Nardil, and Parnate    methylene blue (injected into a vein)    other medicines that contain bupropion like Zyban  This medicine may also interact with the following medications:    alcohol    certain medicines for anxiety or sleep    certain medicines for blood pressure like metoprolol, propranolol    certain medicines for depression or psychotic disturbances    certain medicines for HIV or AIDS like efavirenz, lopinavir, nelfinavir, ritonavir    certain medicines for irregular heart beat like propafenone, flecainide    certain medicines for Parkinson's disease like amantadine, levodopa    certain medicines for seizures like carbamazepine, phenytoin, phenobarbital    cimetidine    clopidogrel    cyclophosphamide    furazolidone    isoniazid    nicotine    orphenadrine    procarbazine    steroid medicines like prednisone or cortisone    stimulant medicines for attention disorders, weight loss, or to stay awake    tamoxifen    theophylline    thiotepa    ticlopidine    tramadol    warfarin  This list may not describe all possible interactions. Give your health care provider a list of all the medicines, herbs, non-prescription drugs, or dietary supplements you use. Also tell them if you smoke,  drink alcohol, or use illegal drugs. Some items may interact with your medicine.  What should I watch for while using this medicine?  Tell your doctor if your symptoms do not get better or if they get worse. Visit your doctor or health care professional for regular checks on your progress. Because it may take several weeks to see the full effects of this medicine, it is important to continue your treatment as prescribed by your doctor.  Patients and their families should watch out for new or worsening thoughts of suicide or depression. Also watch out for sudden changes in feelings such as feeling anxious, agitated, panicky, irritable, hostile, aggressive, impulsive, severely restless, overly excited and hyperactive, or not being able to sleep. If this happens, especially at the beginning of treatment or after a change in dose, call your health care professional.  Avoid alcoholic drinks while taking this medicine. Drinking large amounts of alcoholic beverages, using sleeping or anxiety medicines, or quickly stopping the use of these agents while taking this medicine may increase your risk for a seizure.  Do not drive or use heavy machinery until you know how this medicine affects you. This medicine can impair your ability to perform these tasks.  Do not take this medicine close to bedtime. It may prevent you from sleeping.  Your mouth may get dry. Chewing sugarless gum or sucking hard candy, and drinking plenty of water may help. Contact your doctor if the problem does not go away or is severe.  The tablet shell for some brands of this medicine does not dissolve. This is normal. The tablet shell may appear whole in the stool. This is not a cause for concern.  What side effects may I notice from receiving this medicine?  Side effects that you should report to your doctor or health care professional as soon as possible:    allergic reactions like skin rash, itching or hives, swelling of the face, lips, or  tongue    breathing problems    changes in vision    confusion    fast or irregular heartbeat    hallucinations    increased blood pressure    redness, blistering, peeling or loosening of the skin, including inside the mouth    seizures    suicidal thoughts or other mood changes    unusually weak or tired    vomiting  Side effects that usually do not require medical attention (report to your doctor or health care professional if they continue or are bothersome):    change in sex drive or performance    constipation    headache    loss of appetite    nausea    tremors    weight loss  This list may not describe all possible side effects. Call your doctor for medical advice about side effects. You may report side effects to FDA at 0-090-ETH-6217.  Where should I keep my medicine?  Keep out of the reach of children.  Store at room temperature between 15 and 30 degrees C (59 and 86 degrees F). Throw away any unused medicine after the expiration date.  NOTE:This sheet is a summary. It may not cover all possible information. If you have questions about this medicine, talk to your doctor, pharmacist, or health care provider. Copyright  2016 Gold Standard

## 2018-10-09 NOTE — MR AVS SNAPSHOT
After Visit Summary   10/9/2018    Bita Alaniz    MRN: [id number]           Patient Information     Date Of Birth          1990        Visit Information        Provider Department      10/9/2018 11:40 AM Olya Connors PA-C [facility]        Today's Diagnoses     Carcinoma in situ of cervix, unspecified location    -  1    Anxiety        Genital warts        Encounter for surveillance of contraceptive pills          Care Instructions      Patient Education    Bupropion Hydrobromide Oral tablet, extended-release    Bupropion Hydrochloride Oral tablet [Depression/Mood Disorders]    Bupropion Hydrochloride Oral tablet, extended release 12 hour [Depression/Mood Disorders]    Bupropion Hydrochloride Oral tablet, extended release 12 hour [Smoking Cessation]    Bupropion Hydrochloride Oral tablet, extended release 24 hour [Depression/Mood Disorders]  Bupropion Hydrobromide Oral tablet, extended-release  What is this medicine?  BUPROPION (byoo PROE pee on) is used to treat depression.  This medicine may be used for other purposes; ask your health care provider or pharmacist if you have questions.  What should I tell my health care provider before I take this medicine?  They need to know if you have any of these conditions:    an eating disorder, such as anorexia or bulimia    bipolar disorder or psychosis    diabetes or high blood sugar, treated with medication    glaucoma    head injury or brain tumor    heart disease, previous heart attack, or irregular heart beat    high blood pressure    kidney or liver disease    seizures (convulsions)    suicidal thoughts or a previous suicide attempt    Tourette's syndrome    weight loss    an unusual or allergic reaction to bupropion, other medicines, foods, dyes, or preservatives    breast-feeding    pregnant or trying to become pregnant  How should I use this medicine?  Take this medicine by mouth with a glass of water.  Follow the directions on the prescription label. You can take it with or without food. If it upsets your stomach, take it with food. Do not crush, chew, or cut these tablets. This medicine is taken once daily at the same time each day. Do not take your medicine more often than directed. Do not stop taking this medicine suddenly except upon the advice of your doctor. Stopping this medicine too quickly may cause serious side effects or your condition may worsen.  A special MedGuide will be given to you by the pharmacist with each prescription and refill. Be sure to read this information carefully each time.  Talk to your pediatrician regarding the use of this medicine in children. Special care may be needed.  Overdosage: If you think you have taken too much of this medicine contact a poison control center or emergency room at once.  NOTE: This medicine is only for you. Do not share this medicine with others.  What if I miss a dose?  If you miss a dose, skip the missed dose and take your next tablet at the regular time. Do not take double or extra doses.  What may interact with this medicine?  Do not take this medicine with any of the following medications:    linezolid    MAOIs like Azilect, Carbex, Eldepryl, Marplan, Nardil, and Parnate    methylene blue (injected into a vein)    other medicines that contain bupropion like Zyban  This medicine may also interact with the following medications:    alcohol    certain medicines for anxiety or sleep    certain medicines for blood pressure like metoprolol, propranolol    certain medicines for depression or psychotic disturbances    certain medicines for HIV or AIDS like efavirenz, lopinavir, nelfinavir, ritonavir    certain medicines for irregular heart beat like propafenone, flecainide    certain medicines for Parkinson's disease like amantadine, levodopa    certain medicines for seizures like carbamazepine, phenytoin,  phenobarbital    cimetidine    clopidogrel    cyclophosphamide    furazolidone    isoniazid    nicotine    orphenadrine    procarbazine    steroid medicines like prednisone or cortisone    stimulant medicines for attention disorders, weight loss, or to stay awake    tamoxifen    theophylline    thiotepa    ticlopidine    tramadol    warfarin  This list may not describe all possible interactions. Give your health care provider a list of all the medicines, herbs, non-prescription drugs, or dietary supplements you use. Also tell them if you smoke, drink alcohol, or use illegal drugs. Some items may interact with your medicine.  What should I watch for while using this medicine?  Tell your doctor if your symptoms do not get better or if they get worse. Visit your doctor or health care professional for regular checks on your progress. Because it may take several weeks to see the full effects of this medicine, it is important to continue your treatment as prescribed by your doctor.  Patients and their families should watch out for new or worsening thoughts of suicide or depression. Also watch out for sudden changes in feelings such as feeling anxious, agitated, panicky, irritable, hostile, aggressive, impulsive, severely restless, overly excited and hyperactive, or not being able to sleep. If this happens, especially at the beginning of treatment or after a change in dose, call your health care professional.  Avoid alcoholic drinks while taking this medicine. Drinking large amounts of alcoholic beverages, using sleeping or anxiety medicines, or quickly stopping the use of these agents while taking this medicine may increase your risk for a seizure.  Do not drive or use heavy machinery until you know how this medicine affects you. This medicine can impair your ability to perform these tasks.  Do not take this medicine close to bedtime. It may prevent you from sleeping.  Your mouth may get dry. Chewing sugarless gum or  sucking hard candy, and drinking plenty of water may help. Contact your doctor if the problem does not go away or is severe.  The tablet shell for some brands of this medicine does not dissolve. This is normal. The tablet shell may appear whole in the stool. This is not a cause for concern.  What side effects may I notice from receiving this medicine?  Side effects that you should report to your doctor or health care professional as soon as possible:    allergic reactions like skin rash, itching or hives, swelling of the face, lips, or tongue    breathing problems    changes in vision    confusion    fast or irregular heartbeat    hallucinations    increased blood pressure    redness, blistering, peeling or loosening of the skin, including inside the mouth    seizures    suicidal thoughts or other mood changes    unusually weak or tired    vomiting  Side effects that usually do not require medical attention (report to your doctor or health care professional if they continue or are bothersome):    change in sex drive or performance    constipation    headache    loss of appetite    nausea    tremors    weight loss  This list may not describe all possible side effects. Call your doctor for medical advice about side effects. You may report side effects to FDA at 7-669-FDA-0168.  Where should I keep my medicine?  Keep out of the reach of children.  Store at room temperature between 15 and 30 degrees C (59 and 86 degrees F). Throw away any unused medicine after the expiration date.  NOTE:This sheet is a summary. It may not cover all possible information. If you have questions about this medicine, talk to your doctor, pharmacist, or health care provider. Copyright  2016 Gold Standard                Follow-ups after your visit        Follow-up notes from your care team     Return in about 6 months (around 4/9/2019) for mood.      Who to contact     If you have questions or need follow up information about today's clinic  visit or your schedule please contact [facility] directly at [phone].  Normal or non-critical lab and imaging results will be communicated to you by MyChart, letter or phone within 4 business days after the clinic has received the results. If you do not hear from us within 7 days, please contact the clinic through [email or web address] or phone. If you have a critical or abnormal lab result, we will notify you by phone as soon as possible.  Submit refill requests through [facility] or call your pharmacy and they will forward the refill request to us. Please allow 3 business days for your refill to be completed.          Additional Information About Your Visit        [email or web address] Information     [facility] gives you secure access to your electronic health record. If you see a primary care provider, you can also send messages to your care team and make appointments. If you have questions, please call your primary care clinic.  If you do not have a primary care provider, please call [phone] and they will assist you.        Care EveryWhere ID     This is your Care EveryWhere ID. This could be used by other organizations to access your [facility] medical records  [id number]        Your Vitals Were     Pulse Temperature Last Period BMI (Body Mass Index)          105 98.9  F (37.2  C) (Oral) 10/09/2018 31.89 kg/m2         Blood Pressure from Last 3 Encounters:   10/09/18 130/79   05/21/18 136/80   05/16/18 130/78    Weight from Last 3 Encounters:   10/09/18 180 lb (81.6 kg)   05/21/18 185 lb (83.9 kg)   05/16/18 185 lb (83.9 kg)              We Performed the Following     HPV High Risk Types DNA Cervical     Pap imaged thin layer diagnostic with HPV (select HPV order below)          Where to get your medicines      These medications were sent to [facility]/pharmacy #[id number] - [place], [place] - [address] [address]     Phone:  [phone]     norgestim-eth estrad triphasic  0.18/0.215/0.25 MG-35 MCG per tablet    podofilox 0.5 % external solution         Some of these will need a paper prescription and others can be bought over the counter.  Ask your nurse if you have questions.     Bring a paper prescription for each of these medications     ALPRAZolam 0.5 MG tablet          Primary Care Provider Office Phone # Fax #    Olya Connors PA-C [phone]       [address] [facility] [address]        Equal Access to Services     MELISSA JORDAN : Hadii aad ku hadasho Soomaali, waaxda luqadaha, qaybta kaalmada adeegyada, waxay idiin hayaan adeeg khtaya lazo . So [facility] [phone].    ATENCIÓN: Si habla español, tiene a ovalle disposición servicios gratuitos de asistencia lingüística. [facility] [phone].    We comply with applicable federal civil rights laws and [place] laws. We do not discriminate on the basis of race, color, national origin, age, disability, sex, sexual orientation, or gender identity.            Thank you!     Thank you for choosing [facility]  for your care. Our goal is always to provide you with excellent care. Hearing back from our patients is one way we can continue to improve our services. Please take a few minutes to complete the written survey that you may receive in the mail after your visit with us. Thank you!             Your Updated Medication List - Protect others around you: Learn how to safely use, store and throw away your medicines at www.disposemymeds.org.          This list is accurate as of 10/9/18 12:46 PM.  Always use your most recent med list.                   Brand Name Dispense Instructions for use Diagnosis    ALPRAZolam 0.5 MG tablet    XANAX    30 tablet    Take 1 tablet (0.5 mg) by mouth 3 times daily as needed for anxiety    Anxiety       escitalopram 20 MG tablet    LEXAPRO    90 tablet    Take 1 tablet (20 mg) by mouth daily    Anxiety       ibuprofen 200 MG tablet     ADVIL/MOTRIN     Take 200 mg by mouth as needed. 2-3 TABLETS AT ONE TIME        Multi-vitamin Tabs tablet     100 tablet    Take 1 tablet by mouth daily    Anxiety       norgestim-eth estrad triphasic 0.18/0.215/0.25 MG-35 MCG per tablet    TRINESSA (28)    90 tablet    Take 1 tablet by mouth daily May take continuously, having a period every 3 months.    Encounter for surveillance of contraceptive pills       podofilox 0.5 % external solution    CONDYLOX    59.5 mL    APPLY TOPICALLY 2 TIMES DAILY FOR 3 DAYS THEN STOP FOR 4 DAYS. IF NEEDED REPEAT 4 TIMES    Genital warts       TYLENOL 500 MG tablet   Generic drug:  acetaminophen      Take 1-2 tablets by mouth as needed. 1 TABLET AS NEEDED

## 2018-10-11 LAB
COPATH REPORT: ABNORMAL
PAP: ABNORMAL

## 2018-10-15 LAB
FINAL DIAGNOSIS: ABNORMAL
HPV HR 12 DNA CVX QL NAA+PROBE: POSITIVE
HPV16 DNA SPEC QL NAA+PROBE: NEGATIVE
HPV18 DNA SPEC QL NAA+PROBE: NEGATIVE
SPECIMEN DESCRIPTION: ABNORMAL
SPECIMEN SOURCE CVX/VAG CYTO: ABNORMAL

## 2019-01-30 DIAGNOSIS — F41.9 ANXIETY: ICD-10-CM

## 2019-01-30 NOTE — TELEPHONE ENCOUNTER
"Requested Prescriptions   Pending Prescriptions Disp Refills     escitalopram (LEXAPRO) 20 MG tablet [Pharmacy Med Name: ESCITALOPRAM 20 MG TABLET] 90 tablet 1    Last Written Prescription Date:  5/21/18  Last Fill Quantity: 90,  # refills: 1   Last office visit: 10/9/2018 with prescribing provider:     Future Office Visit:     Sig: TAKE 1 TABLET BY MOUTH EVERY DAY    SSRIs Protocol Passed - 1/30/2019  4:27 PM       Passed - Recent (12 mo) or future (30 days) visit within the authorizing provider's specialty    Patient had office visit in the last 12 months or has a visit in the next 30 days with authorizing provider or within the authorizing provider's specialty.  See \"Patient Info\" tab in inbasket, or \"Choose Columns\" in Meds & Orders section of the refill encounter.             Passed - Medication is active on med list       Passed - Patient is age 18 or older       Passed - No active pregnancy on record       Passed - No positive pregnancy test in last 12 months          "

## 2019-01-31 RX ORDER — ESCITALOPRAM OXALATE 20 MG/1
TABLET ORAL
Qty: 60 TABLET | Refills: 0 | Status: SHIPPED | OUTPATIENT
Start: 2019-01-31 | End: 2019-03-19

## 2019-01-31 NOTE — TELEPHONE ENCOUNTER
Patient due to follow up on mood 4/2019. 0 day supply given with note to pharmacy that the patient needs an appointment for further refills.     Delisa Manzanares RN

## 2019-03-15 DIAGNOSIS — F41.9 ANXIETY: ICD-10-CM

## 2019-03-19 ENCOUNTER — OFFICE VISIT (OUTPATIENT)
Dept: FAMILY MEDICINE | Facility: CLINIC | Age: 29
End: 2019-03-19
Payer: COMMERCIAL

## 2019-03-19 VITALS
OXYGEN SATURATION: 100 % | WEIGHT: 183 LBS | TEMPERATURE: 98.5 F | SYSTOLIC BLOOD PRESSURE: 117 MMHG | BODY MASS INDEX: 32.43 KG/M2 | HEART RATE: 101 BPM | HEIGHT: 63 IN | DIASTOLIC BLOOD PRESSURE: 82 MMHG

## 2019-03-19 DIAGNOSIS — M54.6 ACUTE LEFT-SIDED THORACIC BACK PAIN: Primary | ICD-10-CM

## 2019-03-19 PROCEDURE — 99213 OFFICE O/P EST LOW 20 MIN: CPT | Performed by: NURSE PRACTITIONER

## 2019-03-19 RX ORDER — ESCITALOPRAM OXALATE 20 MG/1
TABLET ORAL
Qty: 90 TABLET | Refills: 1 | Status: SHIPPED | OUTPATIENT
Start: 2019-03-19

## 2019-03-19 RX ORDER — OXYCODONE AND ACETAMINOPHEN 5; 325 MG/1; MG/1
1 TABLET ORAL EVERY 8 HOURS PRN
Qty: 10 TABLET | Refills: 0 | Status: SHIPPED | OUTPATIENT
Start: 2019-03-19 | End: 2019-03-22

## 2019-03-19 RX ORDER — CYCLOBENZAPRINE HCL 10 MG
10 TABLET ORAL 3 TIMES DAILY PRN
Qty: 30 TABLET | Refills: 1 | Status: SHIPPED | OUTPATIENT
Start: 2019-03-19

## 2019-03-19 ASSESSMENT — MIFFLIN-ST. JEOR: SCORE: 1519.21

## 2019-03-19 ASSESSMENT — PAIN SCALES - GENERAL: PAINLEVEL: EXTREME PAIN (9)

## 2019-03-19 NOTE — PATIENT INSTRUCTIONS
You can take 1 tablet Flexeril. every 8 hours as needed. This will reduce muscle spasms and help reduce your pain. It can make you drowsy. Do not take before work or driving.     Take 800 mg ibuprofen (with food) every 8 hours. At least three times daily for three days for anti-inflammatory effect alone. After 3 days, you can take as needed for pain    You can take 1 tablet percocet every 8 hours as needed for severe pain not relieved with the above medications.     If you develop shortness of breath, cough or feel lightheaded, call 911    Do not take percocet and xanax together. Be cautious about taking percocet and Flexeril together because they can both make you drowsy    Schedule follow up with LAURA Perea early April

## 2019-03-19 NOTE — PROGRESS NOTES
SUBJECTIVE:   Bita Alaniz is a 28 year old female who presents to clinic today for the following health issues:      Joint Pain    Onset: this morning    Description:   Location: left shoulder  Character: Sharp and throbbing    Intensity: severe    Progression of Symptoms: worse    Accompanying Signs & Symptoms:  Other symptoms: breast feels achey    History:   Previous similar pain: no       Precipitating factors:   Trauma or overuse: no     Alleviating factors:  Improved by: nothing    Therapies Tried and outcome: advil and tylenol not helpful    Woke up this morning and had severe left side upper back pain, between shoulder blades  Does not radiate  Took 800 mg and 500 tylenol without any relief  Pain 8/10 at rest and worsens with activity  Worse with deep inspiration  No cough or SOB  Denies recent calf pain or swelling  Is a smoker and on COCs        Problem list and histories reviewed & adjusted, as indicated.  Additional history: as documented    Patient Active Problem List   Diagnosis     PMS (premenstrual syndrome)     Low back pain     Menorrhagia     CARDIOVASCULAR SCREENING; LDL GOAL LESS THAN 160     Sweating     Anxiety     Contraception management     Genital warts     NATE III (cervical intraepithelial neoplasia III)     Tobacco abuse disorder     Right foot pain     Past Surgical History:   Procedure Laterality Date     C ORAL SURGERY PROCEDURE      wisdom teeth removed     LEEP TX, CERVICAL  05/26/2017 5/26/17 LEEP: NATE 3, neg margins. ECC: neg       Social History     Tobacco Use     Smoking status: Current Every Day Smoker     Packs/day: 0.75     Types: Cigarettes     Smokeless tobacco: Never Used     Tobacco comment: started at age 15, not much second hand   Substance Use Topics     Alcohol use: Yes     Comment: occasionally     Family History   Problem Relation Age of Onset     Musculoskeletal Disorder Mother      Diabetes Mother      Psychotic Disorder Mother         anxiety, ocd  "    Musculoskeletal Disorder Maternal Grandmother      Cancer Maternal Grandmother         lung     Cancer Paternal Grandmother      Unknown/Adopted Father            Reviewed and updated as needed this visit by clinical staff  Tobacco  Allergies  Meds  Med Hx  Surg Hx  Fam Hx  Soc Hx      Reviewed and updated as needed this visit by Provider         ROS:  Constitutional, HEENT, cardiovascular, pulmonary, gi and gu systems are negative, except as otherwise noted.    OBJECTIVE:     /82 (BP Location: Right arm, Patient Position: Chair, Cuff Size: Adult Regular)   Pulse 101   Temp 98.5  F (36.9  C) (Oral)   Ht 1.6 m (5' 3\")   Wt 83 kg (183 lb)   LMP 02/25/2019   SpO2 100%   Breastfeeding? No   BMI 32.42 kg/m    Body mass index is 32.42 kg/m .  GENERAL: healthy, alert and no distress  RESP: lungs clear to auscultation - no rales, rhonchi or wheezes  CV: regular rate and rhythm, normal S1 S2, no S3 or S4, no murmur, click or rub, no peripheral edema and peripheral pulses strong  MS: No tenderness to thoracic spinous processes. Tenderness to palpation trapezius muscle between spinal cord and scapula approximately T4-6. Worsened with upper body movement, flexion and lateral rotation cervical spine  SKIN: no suspicious lesions or rashes  NEURO: Normal strength and tone, mentation intact and speech normal    Diagnostic Test Results:  none     ASSESSMENT/PLAN:       ICD-10-CM    1. Acute left-sided thoracic back pain M54.6 oxyCODONE-acetaminophen (PERCOCET) 5-325 MG tablet     cyclobenzaprine (FLEXERIL) 10 MG tablet     Smoker, on COCs. Consider PE but VSS (tachycardic appears to be at baseline) and no respiratory symptoms. I do not think further testing is indicated but did review S/S and when to seek emergent care  I was able to reproduce pain on exam which is consistent with soft tissue inflammation. Will treat with high dose NSAIDs, muscle relaxant and I ordered 10 tablets percocet for break through " pain as she does appear to be in a lot of pain today. She reports she does not tolerate Tramadol or Norco. Warned of sedating potential, risk for habit formation and risk for respiratory suppression. She takes alprazolam very sparingly and will not take if she took percocet within 24 hours  Call clinic if pain not improving over next 24 hours    Patient Instructions   You can take 1 tablet Flexeril. every 8 hours as needed. This will reduce muscle spasms and help reduce your pain. It can make you drowsy. Do not take before work or driving.     Take 800 mg ibuprofen (with food) every 8 hours. At least three times daily for three days for anti-inflammatory effect alone. After 3 days, you can take as needed for pain    You can take 1 tablet percocet every 8 hours as needed for severe pain not relieved with the above medications.     If you develop shortness of breath, cough or feel lightheaded, call 911    Do not take percocet and xanax together. Be cautious about taking percocet and Flexeril together because they can both make you drowsy    Schedule follow up with LAURA Perea early April      CORINE Alston [facility]

## 2019-04-19 ENCOUNTER — HEALTH MAINTENANCE LETTER (OUTPATIENT)
Age: 29
End: 2019-04-19

## 2019-04-29 ENCOUNTER — TELEPHONE (OUTPATIENT)
Dept: FAMILY MEDICINE | Facility: CLINIC | Age: 29
End: 2019-04-29

## 2019-04-29 DIAGNOSIS — D06.9 CARCINOMA IN SITU OF CERVIX, UNSPECIFIED LOCATION: ICD-10-CM

## 2019-04-29 NOTE — TELEPHONE ENCOUNTER
Pt is past due for colposcopy with Dr. Lynn at [facility] clinic and schedule.    Yeny Moya, LUZ MARIAN, RN, Pap Tracking Nurse

## 2019-10-07 DIAGNOSIS — Z30.41 ENCOUNTER FOR SURVEILLANCE OF CONTRACEPTIVE PILLS: ICD-10-CM

## 2019-10-07 NOTE — TELEPHONE ENCOUNTER
"Requested Prescriptions   Pending Prescriptions Disp Refills     TRI FEMYNOR 0.18/0.215/0.25 MG-35 MCG tablet [Pharmacy Med Name: TRI FEMYNOR 28 TABLET] 84 tablet 1     Sig: TAKE 1 TABLET BY MOUTH DAILY MAY TAKE CONTINUOUSLY, HAVING A PERIOD EVERY 3 MONTHS.   Last Written Prescription Date:  10-9-18  Last Fill Quantity: 90,  # refills: 3   Last office visit: 3/19/2019 with prescribing provider:  10-9-18   Future Office Visit:        Contraceptives Protocol Passed - 10/7/2019  5:12 PM        Passed - Patient is not a current smoker if age is 35 or older        Passed - Recent (12 mo) or future (30 days) visit within the authorizing provider's specialty     Patient has had an office visit with the authorizing provider or a provider within the authorizing providers department within the previous 12 mos or has a future within next 30 days. See \"Patient Info\" tab in inbasket, or \"Choose Columns\" in Meds & Orders section of the refill encounter.              Passed - Medication is active on med list        Passed - No active pregnancy on record        Passed - No positive pregnancy test in past 12 months          "

## 2019-10-08 RX ORDER — NORGESTIMATE AND ETHINYL ESTRADIOL 7DAYSX3 28
KIT ORAL
Qty: 84 TABLET | Refills: 1 | Status: SHIPPED | OUTPATIENT
Start: 2019-10-08

## 2020-02-23 ENCOUNTER — HEALTH MAINTENANCE LETTER (OUTPATIENT)
Age: 30
End: 2020-02-23

## 2020-12-12 ENCOUNTER — HEALTH MAINTENANCE LETTER (OUTPATIENT)
Age: 30
End: 2020-12-12

## 2021-04-11 ENCOUNTER — HEALTH MAINTENANCE LETTER (OUTPATIENT)
Age: 31
End: 2021-04-11

## 2021-09-26 ENCOUNTER — HEALTH MAINTENANCE LETTER (OUTPATIENT)
Age: 31
End: 2021-09-26

## 2022-05-08 ENCOUNTER — HEALTH MAINTENANCE LETTER (OUTPATIENT)
Age: 32
End: 2022-05-08

## 2023-01-08 ENCOUNTER — HEALTH MAINTENANCE LETTER (OUTPATIENT)
Age: 33
End: 2023-01-08

## 2023-06-02 ENCOUNTER — HEALTH MAINTENANCE LETTER (OUTPATIENT)
Age: 33
End: 2023-06-02

## 2024-05-10 NOTE — TELEPHONE ENCOUNTER
"Requested Prescriptions   Pending Prescriptions Disp Refills     escitalopram (LEXAPRO) 20 MG tablet [Pharmacy Med Name: ESCITALOPRAM 20 MG TABLET] 90 tablet 1    Last Written Prescription Date:  1/31/19  Last Fill Quantity: 60,  # refills: 0   Last office visit: 10/9/2018 with prescribing provider:     Future Office Visit:     Sig: TAKE 1 TABLET BY MOUTH EVERY DAY    SSRIs Protocol Passed - 3/15/2019  6:28 PM       Passed - Recent (12 mo) or future (30 days) visit within the authorizing provider's specialty    Patient had office visit in the last 12 months or has a visit in the next 30 days with authorizing provider or within the authorizing provider's specialty.  See \"Patient Info\" tab in inbasket, or \"Choose Columns\" in Meds & Orders section of the refill encounter.             Passed - Medication is active on med list       Passed - Patient is age 18 or older       Passed - No active pregnancy on record       Passed - No positive pregnancy test in last 12 months          "
Dr. Beltran